# Patient Record
Sex: FEMALE | Race: WHITE | NOT HISPANIC OR LATINO | Employment: STUDENT | URBAN - NONMETROPOLITAN AREA
[De-identification: names, ages, dates, MRNs, and addresses within clinical notes are randomized per-mention and may not be internally consistent; named-entity substitution may affect disease eponyms.]

---

## 2024-04-25 LAB
CHOLEST SERPL-MSCNC: 216 MG/DL (ref 0–200)
HDLC SERPL-MCNC: 39 MG/DL (ref 35–70)
LDLC SERPL CALC-MCNC: 145 MG/DL (ref 0–160)
TRIGL SERPL-MCNC: 176 MG/DL (ref 40–160)
VLDLC SERPL-MCNC: 32 MG/DL

## 2024-05-21 ENCOUNTER — OFFICE VISIT (OUTPATIENT)
Dept: PRIMARY CARE CLINIC | Facility: CLINIC | Age: 29
End: 2024-05-21
Payer: MEDICAID

## 2024-05-21 VITALS
OXYGEN SATURATION: 99 % | BODY MASS INDEX: 31 KG/M2 | DIASTOLIC BLOOD PRESSURE: 73 MMHG | HEIGHT: 61 IN | WEIGHT: 164.19 LBS | TEMPERATURE: 98 F | SYSTOLIC BLOOD PRESSURE: 120 MMHG | HEART RATE: 111 BPM | RESPIRATION RATE: 18 BRPM

## 2024-05-21 DIAGNOSIS — Z76.89 ENCOUNTER TO ESTABLISH CARE: Primary | ICD-10-CM

## 2024-05-21 DIAGNOSIS — J45.20 MILD INTERMITTENT ASTHMA, UNSPECIFIED WHETHER COMPLICATED: ICD-10-CM

## 2024-05-21 DIAGNOSIS — Z23 NEED FOR TDAP VACCINATION: ICD-10-CM

## 2024-05-21 DIAGNOSIS — J35.1 LARGE TONSILS: ICD-10-CM

## 2024-05-21 DIAGNOSIS — F32.A DEPRESSION, UNSPECIFIED DEPRESSION TYPE: ICD-10-CM

## 2024-05-21 DIAGNOSIS — H47.10 PAPILLEDEMA: ICD-10-CM

## 2024-05-21 DIAGNOSIS — M79.7 FIBROMYALGIA: ICD-10-CM

## 2024-05-21 DIAGNOSIS — M54.32 BILATERAL SCIATICA: ICD-10-CM

## 2024-05-21 DIAGNOSIS — M35.9 CONNECTIVE TISSUE DISORDER: ICD-10-CM

## 2024-05-21 DIAGNOSIS — E55.9 VITAMIN D DEFICIENCY: ICD-10-CM

## 2024-05-21 DIAGNOSIS — R39.12 WEAK URINE STREAM: ICD-10-CM

## 2024-05-21 DIAGNOSIS — F90.0 ATTENTION DEFICIT HYPERACTIVITY DISORDER (ADHD), PREDOMINANTLY INATTENTIVE TYPE: ICD-10-CM

## 2024-05-21 DIAGNOSIS — N92.6 IRREGULAR MENSTRUAL CYCLE: ICD-10-CM

## 2024-05-21 DIAGNOSIS — M54.31 BILATERAL SCIATICA: ICD-10-CM

## 2024-05-21 PROBLEM — F64.0 TRANSSEXUALISM: Status: ACTIVE | Noted: 2017-12-19

## 2024-05-21 PROBLEM — G89.29 CHRONIC PAIN: Status: ACTIVE | Noted: 2022-06-10

## 2024-05-21 PROBLEM — M54.30 BACK PAIN WITH SCIATICA: Status: ACTIVE | Noted: 2022-09-15

## 2024-05-21 PROBLEM — M54.9 BACK PAIN WITH SCIATICA: Status: ACTIVE | Noted: 2022-09-15

## 2024-05-21 PROBLEM — K21.9 GASTROESOPHAGEAL REFLUX DISEASE: Status: ACTIVE | Noted: 2022-06-10

## 2024-05-21 PROBLEM — R20.2 NUMBNESS AND TINGLING OF HAND: Status: RESOLVED | Noted: 2022-09-15 | Resolved: 2024-05-21

## 2024-05-21 PROBLEM — E66.9 OBESITY: Status: ACTIVE | Noted: 2022-06-10

## 2024-05-21 PROBLEM — F31.9 BIPOLAR 1 DISORDER: Status: RESOLVED | Noted: 2022-06-10 | Resolved: 2024-05-21

## 2024-05-21 PROBLEM — R20.0 NUMBNESS AND TINGLING OF HAND: Status: RESOLVED | Noted: 2022-09-15 | Resolved: 2024-05-21

## 2024-05-21 PROBLEM — F33.2 SEVERE RECURRENT MAJOR DEPRESSION WITHOUT PSYCHOTIC FEATURES: Status: ACTIVE | Noted: 2022-06-10

## 2024-05-21 PROBLEM — Z79.890 HORMONE REPLACEMENT THERAPY: Status: ACTIVE | Noted: 2021-09-22

## 2024-05-21 PROBLEM — E78.00 HYPERCHOLESTEREMIA: Status: ACTIVE | Noted: 2022-05-11

## 2024-05-21 PROBLEM — K59.00 CONSTIPATION: Status: ACTIVE | Noted: 2022-06-10

## 2024-05-21 PROBLEM — Z78.9 FEMALE-TO-MALE TRANSGENDER PERSON: Status: ACTIVE | Noted: 2021-09-22

## 2024-05-21 PROBLEM — F90.9 ATTENTION DEFICIT HYPERACTIVITY DISORDER: Status: ACTIVE | Noted: 2022-06-10

## 2024-05-21 PROCEDURE — 3008F BODY MASS INDEX DOCD: CPT | Mod: CPTII,,, | Performed by: NURSE PRACTITIONER

## 2024-05-21 PROCEDURE — 3078F DIAST BP <80 MM HG: CPT | Mod: CPTII,,, | Performed by: NURSE PRACTITIONER

## 2024-05-21 PROCEDURE — 99999PBSHW TDAP VACCINE GREATER THAN OR EQUAL TO 7YO IM: Mod: PBBFAC,,,

## 2024-05-21 PROCEDURE — 99999 PR PBB SHADOW E&M-NEW PATIENT-LVL V: CPT | Mod: PBBFAC,,, | Performed by: NURSE PRACTITIONER

## 2024-05-21 PROCEDURE — 99205 OFFICE O/P NEW HI 60 MIN: CPT | Mod: S$PBB,,, | Performed by: NURSE PRACTITIONER

## 2024-05-21 PROCEDURE — 90715 TDAP VACCINE 7 YRS/> IM: CPT | Mod: PBBFAC

## 2024-05-21 PROCEDURE — 1159F MED LIST DOCD IN RCRD: CPT | Mod: CPTII,,, | Performed by: NURSE PRACTITIONER

## 2024-05-21 PROCEDURE — 3074F SYST BP LT 130 MM HG: CPT | Mod: CPTII,,, | Performed by: NURSE PRACTITIONER

## 2024-05-21 PROCEDURE — 90471 IMMUNIZATION ADMIN: CPT | Mod: PBBFAC

## 2024-05-21 PROCEDURE — 99205 OFFICE O/P NEW HI 60 MIN: CPT | Mod: PBBFAC,25 | Performed by: NURSE PRACTITIONER

## 2024-05-21 RX ORDER — MONTELUKAST SODIUM 10 MG/1
10 TABLET ORAL
COMMUNITY
Start: 2024-05-16

## 2024-05-21 RX ORDER — DULOXETINE 40 MG/1
2 CAPSULE, DELAYED RELEASE ORAL
COMMUNITY
Start: 2024-05-14

## 2024-05-21 RX ORDER — FINASTERIDE 5 MG/1
5 TABLET, FILM COATED ORAL DAILY
COMMUNITY

## 2024-05-21 RX ORDER — CLINDAMYCIN PHOSPHATE 10 UG/ML
LOTION TOPICAL
COMMUNITY
Start: 2024-05-10

## 2024-05-21 RX ORDER — MINOXIDIL 2.5 MG/1
2.5 TABLET ORAL NIGHTLY
COMMUNITY
Start: 2024-05-10

## 2024-05-21 RX ORDER — ERGOCALCIFEROL 1.25 MG/1
50000 CAPSULE ORAL
Qty: 8 CAPSULE | Refills: 1 | Status: SHIPPED | OUTPATIENT
Start: 2024-05-23 | End: 2024-07-22

## 2024-05-21 RX ORDER — GABAPENTIN 300 MG/1
300 CAPSULE ORAL 3 TIMES DAILY
COMMUNITY
Start: 2024-05-16

## 2024-05-21 RX ORDER — NEEDLES, DISPOSABLE 27GX1/2"
NEEDLE, DISPOSABLE MISCELLANEOUS
COMMUNITY

## 2024-05-21 RX ORDER — ALBUTEROL SULFATE 90 UG/1
2 AEROSOL, METERED RESPIRATORY (INHALATION) EVERY 6 HOURS PRN
COMMUNITY
Start: 2024-04-27

## 2024-05-21 RX ORDER — ATOMOXETINE 80 MG/1
80 CAPSULE ORAL EVERY MORNING
COMMUNITY
Start: 2024-05-14

## 2024-05-21 RX ORDER — SYRINGE W-NEEDLE,DISPOSAB,3 ML 25GX5/8"
SYRINGE, EMPTY DISPOSABLE MISCELLANEOUS
COMMUNITY

## 2024-05-21 NOTE — PROGRESS NOTES
"[facility] Note    HPI:  Jonas Aguilar is a 29 y.o. female who presents today for Establish Care, Pelvic Pain, and Vaginal Bleeding (Pt here to establish care/lower pelvis pain/vaginal bleeding x 3 weeks/ had depo shot 3-18-24)      Review of Systems   Constitutional:  Positive for malaise/fatigue.   HENT: Negative.     Eyes: Negative.    Respiratory: Negative.     Cardiovascular: Negative.    Gastrointestinal:  Positive for abdominal pain, constipation, diarrhea and heartburn.   Genitourinary:  Positive for frequency.   Musculoskeletal:  Positive for back pain, falls and joint pain.   Skin: Negative.    Neurological:  Positive for dizziness and headaches.   Endo/Heme/Allergies: Negative.    Psychiatric/Behavioral:  Positive for depression and suicidal ideas. The patient is nervous/anxious and has insomnia.       A review of systems was performed and was negative except as noted above.    I personally reviewed allergies, past medical, surgical, social and family history and updated as appropriate.    Medications:    Current Outpatient Medications:     albuterol (PROVENTIL/VENTOLIN HFA) 90 mcg/actuation inhaler, 2 puffs every 6 (six) hours as needed., Disp: , Rfl:     atomoxetine (STRATTERA) 80 MG capsule, Take 80 mg by mouth every morning., Disp: , Rfl:     cholecalciferol, vitamin D3, (VITAMIN D3) 2,000 unit Cap, Take 1 capsule by mouth once daily., Disp: , Rfl:     clindamycin (CLEOCIN T) 1 % lotion, Apply topically., Disp: , Rfl:     DULoxetine 40 mg CpDR, Take 2 capsules by mouth., Disp: , Rfl:     finasteride (PROSCAR) 5 mg tablet, Take 5 mg by mouth once daily., Disp: , Rfl:     gabapentin (NEURONTIN) 300 MG capsule, Take 300 mg by mouth 3 (three) times daily., Disp: , Rfl:     minoxidiL (LONITEN) 2.5 MG tablet, Take 2.5 mg by mouth every evening., Disp: , Rfl:     montelukast (SINGULAIR) 10 mg tablet, Take 10 mg by mouth., Disp: , Rfl:     needle, disp, 18 G 18 gauge x 1" Ndle, BD Regular " "Bevel Needles 18 gauge x 1"  USE 1 EACH EVERY 7 DAYS TO DRAW UP TESTOTERONE, Disp: , Rfl:     omeprazole (PRILOSEC) 20 MG capsule, Take 20 mg by mouth once daily., Disp: , Rfl:     syringe with needle 3 mL 21 gauge x 1 1/2" Syrg, BD Luer-Edmar Syringe 3 mL 21 gauge x 1 1/2"  USE 1 SYRINGE FOR TESTOSTERONE INJ EVERY 7 DAYS.(SYRINGE SIZE DIFFERENT/MD), Disp: , Rfl:     [START ON 5/23/2024] ergocalciferol (VITAMIN D2) 50,000 unit Cap, Take 1 capsule (50,000 Units total) by mouth twice a week., Disp: 8 capsule, Rfl: 1    ferrous sulfate 325 (65 FE) MG EC tablet, Take 325 mg by mouth 3 (three) times daily with meals. (Patient not taking: Reported on 5/21/2024), Disp: , Rfl:     fexofenadine (ALLEGRA) 30 MG tablet, Take 30 mg by mouth 2 (two) times daily. (Patient not taking: Reported on 5/21/2024), Disp: , Rfl:      Health Maintenance:  Immunization History   Administered Date(s) Administered    DTP 1997, 1997, 1997, 07/06/1998    DTaP 04/22/2002    HIB 1997, 1997, 1997, 07/06/1998    HPV 9-Valent 07/22/2022    Hepatitis B, Pediatric/Adolescent 1997, 1997, 1997    IPV 04/22/2002    MMR 02/19/1998, 04/22/2002    Meningococcal Conjugate (MCV4P) 07/07/2008    OPV 1997, 1997, 1997    Tdap 07/07/2008    Varicella 02/19/1998, 07/07/2008      Health Maintenance   Topic Date Due    Hepatitis C Screening  Never done    TETANUS VACCINE  07/07/2018    Lipid Panel  Completed     Health Maintenance Topics with due status: Not Due       Topic Last Completion Date    Influenza Vaccine Not Due     Health Maintenance Due   Topic Date Due    Hepatitis C Screening  Never done    HIV Screening  Never done    TETANUS VACCINE  07/07/2018    COVID-19 Vaccine (1 - 2023-24 season) Never done       PHYSICAL EXAM:  Vitals:    05/21/24 1339   BP: 120/73   BP Location: Left arm   Patient Position: Sitting   BP Method: Medium (Automatic)   Pulse: (!) 111   Resp: 18   Temp: 98 °F " "(36.7 °C)   TempSrc: Temporal   SpO2: 99%   Weight: 74.5 kg (164 lb 3.2 oz)   Height: 5' 1" (1.549 m)     Body mass index is 31.03 kg/m².  Physical Exam  Vitals and nursing note reviewed.   Constitutional:       Appearance: Normal appearance. She is normal weight.   HENT:      Head: Normocephalic.      Right Ear: Tympanic membrane, ear canal and external ear normal.      Left Ear: Tympanic membrane, ear canal and external ear normal.      Nose: Nose normal.      Mouth/Throat:      Mouth: Mucous membranes are moist.      Pharynx: Posterior oropharyngeal erythema present.      Tonsils: 2+ on the right. 2+ on the left.   Cardiovascular:      Rate and Rhythm: Normal rate and regular rhythm.      Pulses: Normal pulses.      Heart sounds: Normal heart sounds.   Pulmonary:      Effort: Pulmonary effort is normal.      Breath sounds: Normal breath sounds.   Abdominal:      General: Abdomen is flat. Bowel sounds are normal.      Palpations: Abdomen is soft.   Musculoskeletal:         General: Normal range of motion.      Cervical back: Normal range of motion.   Skin:     General: Skin is warm and dry.   Neurological:      General: No focal deficit present.      Mental Status: She is alert and oriented to person, place, and time.          ASSESSMENT/PLAN:  1. Encounter to establish care    2. Irregular menstrual cycle  -     Ambulatory referral/consult to Obstetrics / Gynecology; Future; Expected date: 05/28/2024    3. Large tonsils  -     Ambulatory referral/consult to ENT; Future; Expected date: 05/28/2024    4. Depression, unspecified depression type    5. Attention deficit hyperactivity disorder (ADHD), predominantly inattentive type    6. Papilledema    7. Mild intermittent asthma, unspecified whether complicated    8. Connective tissue disorder    9. Bilateral sciatica    10. Fibromyalgia    11. Vitamin D deficiency  -     Misc Sendout Test, Blood Vitamin D; Future; Expected date: 07/21/2024    12. Weak urine stream  -  "    Ambulatory referral/consult to Urology; Future; Expected date: 05/21/2024    13. Need for Tdap vaccination  -     Cancel: (In Office Administered) Tdap Vaccine  -     (In Office Administered) Tdap Vaccine; Future; Expected date: 05/21/2024    Other orders  -     ergocalciferol (VITAMIN D2) 50,000 unit Cap; Take 1 capsule (50,000 Units total) by mouth twice a week.  Dispense: 8 capsule; Refill: 1        Other than changes above, continue current medications and maintain follow up with specialists.      Follow up if symptoms worsen or fail to improve.   No results found for this or any previous visit (from the past 2016 hour(s)).      GORDON Castro  [facility]

## 2024-05-23 DIAGNOSIS — J35.1 LARGE TONSILS: Primary | ICD-10-CM

## 2024-05-23 DIAGNOSIS — H47.10 PAPILLEDEMA: ICD-10-CM

## 2024-05-23 DIAGNOSIS — Z11.59 ENCOUNTER FOR HEPATITIS C SCREENING TEST FOR LOW RISK PATIENT: Primary | ICD-10-CM

## 2024-05-23 DIAGNOSIS — Z11.4 ENCOUNTER FOR SCREENING FOR HIV: ICD-10-CM

## 2024-05-23 DIAGNOSIS — H47.10 PAPILLEDEMA: Primary | ICD-10-CM

## 2024-05-31 ENCOUNTER — LAB VISIT (OUTPATIENT)
Dept: PRIMARY CARE CLINIC | Facility: CLINIC | Age: 29
End: 2024-05-31
Payer: MEDICAID

## 2024-05-31 DIAGNOSIS — Z11.59 ENCOUNTER FOR HEPATITIS C SCREENING TEST FOR LOW RISK PATIENT: ICD-10-CM

## 2024-05-31 DIAGNOSIS — Z13.0 SCREENING FOR IRON DEFICIENCY ANEMIA: ICD-10-CM

## 2024-05-31 DIAGNOSIS — E55.9 VITAMIN D DEFICIENCY: ICD-10-CM

## 2024-05-31 DIAGNOSIS — E78.00 HYPERCHOLESTEREMIA: ICD-10-CM

## 2024-05-31 DIAGNOSIS — Z13.29 THYROID DISORDER SCREEN: ICD-10-CM

## 2024-05-31 DIAGNOSIS — Z13.1 SCREENING FOR DIABETES MELLITUS (DM): ICD-10-CM

## 2024-05-31 DIAGNOSIS — Z11.4 ENCOUNTER FOR SCREENING FOR HIV: ICD-10-CM

## 2024-05-31 DIAGNOSIS — E78.00 HYPERCHOLESTEREMIA: Primary | ICD-10-CM

## 2024-05-31 DIAGNOSIS — E55.9 VITAMIN D DEFICIENCY DISEASE: ICD-10-CM

## 2024-05-31 DIAGNOSIS — E87.6 HYPOKALEMIA: Primary | ICD-10-CM

## 2024-05-31 LAB
25(OH)D3+25(OH)D2 SERPL-MCNC: 29 NG/ML (ref 30–96)
ALBUMIN SERPL BCP-MCNC: 3.8 G/DL (ref 3.5–5.2)
ALP SERPL-CCNC: 70 U/L (ref 55–135)
ALT SERPL W/O P-5'-P-CCNC: 28 U/L (ref 10–44)
ANION GAP SERPL CALC-SCNC: 11 MMOL/L (ref 3–11)
AST SERPL-CCNC: 14 U/L (ref 10–40)
BASOPHILS # BLD AUTO: 0.05 K/UL (ref 0–0.2)
BASOPHILS NFR BLD: 0.6 % (ref 0–1.9)
BILIRUB SERPL-MCNC: 0.5 MG/DL (ref 0.1–1)
BUN SERPL-MCNC: 11 MG/DL (ref 6–20)
CALCIUM SERPL-MCNC: 9.3 MG/DL (ref 8.7–10.5)
CHLORIDE SERPL-SCNC: 104 MMOL/L (ref 95–110)
CHOLEST SERPL-MCNC: 197 MG/DL (ref 120–199)
CHOLEST/HDLC SERPL: 5.3 {RATIO} (ref 2–5)
CO2 SERPL-SCNC: 23 MMOL/L (ref 23–29)
CREAT SERPL-MCNC: 1 MG/DL (ref 0.5–1.4)
DIFFERENTIAL METHOD BLD: ABNORMAL
EOSINOPHIL # BLD AUTO: 0.1 K/UL (ref 0–0.5)
EOSINOPHIL NFR BLD: 1.2 % (ref 0–8)
ERYTHROCYTE [DISTWIDTH] IN BLOOD BY AUTOMATED COUNT: 12.4 % (ref 11.5–14.5)
EST. GFR  (NO RACE VARIABLE): >60 ML/MIN/1.73 M^2
ESTIMATED AVG GLUCOSE: 103 MG/DL (ref 68–131)
GLUCOSE SERPL-MCNC: 131 MG/DL (ref 70–110)
HBA1C MFR BLD: 5.2 % (ref 4–5.6)
HCT VFR BLD AUTO: 48.8 % (ref 37–48.5)
HCV AB SERPL QL IA: NORMAL
HDLC SERPL-MCNC: 37 MG/DL (ref 40–75)
HDLC SERPL: 18.8 % (ref 20–50)
HGB BLD-MCNC: 16.4 G/DL (ref 12–16)
HIV 1+2 AB+HIV1 P24 AG SERPL QL IA: NORMAL
IMM GRANULOCYTES # BLD AUTO: 0.02 K/UL (ref 0–0.04)
IMM GRANULOCYTES NFR BLD AUTO: 0.2 % (ref 0–0.5)
LDLC SERPL CALC-MCNC: 121.6 MG/DL (ref 63–159)
LYMPHOCYTES # BLD AUTO: 2.4 K/UL (ref 1–4.8)
LYMPHOCYTES NFR BLD: 28.5 % (ref 18–48)
MCH RBC QN AUTO: 29.5 PG (ref 27–31)
MCHC RBC AUTO-ENTMCNC: 33.6 G/DL (ref 32–36)
MCV RBC AUTO: 88 FL (ref 82–98)
MONOCYTES # BLD AUTO: 0.7 K/UL (ref 0.3–1)
MONOCYTES NFR BLD: 8.3 % (ref 4–15)
NEUTROPHILS # BLD AUTO: 5.2 K/UL (ref 1.8–7.7)
NEUTROPHILS NFR BLD: 61.2 % (ref 38–73)
NONHDLC SERPL-MCNC: 160 MG/DL
NRBC BLD-RTO: 0 /100 WBC
PLATELET # BLD AUTO: 259 K/UL (ref 150–450)
PMV BLD AUTO: 11.4 FL (ref 9.2–12.9)
POTASSIUM SERPL-SCNC: 3.4 MMOL/L (ref 3.5–5.1)
PROT SERPL-MCNC: 7.8 G/DL (ref 6–8.4)
RBC # BLD AUTO: 5.55 M/UL (ref 4–5.4)
SODIUM SERPL-SCNC: 138 MMOL/L (ref 136–145)
TRIGL SERPL-MCNC: 192 MG/DL (ref 30–150)
TSH SERPL DL<=0.005 MIU/L-ACNC: 0.59 UIU/ML (ref 0.4–4)
WBC # BLD AUTO: 8.53 K/UL (ref 3.9–12.7)

## 2024-05-31 PROCEDURE — 82306 VITAMIN D 25 HYDROXY: CPT | Performed by: NURSE PRACTITIONER

## 2024-05-31 PROCEDURE — 84443 ASSAY THYROID STIM HORMONE: CPT | Performed by: NURSE PRACTITIONER

## 2024-05-31 PROCEDURE — 80061 LIPID PANEL: CPT | Performed by: NURSE PRACTITIONER

## 2024-05-31 PROCEDURE — 83036 HEMOGLOBIN GLYCOSYLATED A1C: CPT | Performed by: NURSE PRACTITIONER

## 2024-05-31 PROCEDURE — 86803 HEPATITIS C AB TEST: CPT | Performed by: NURSE PRACTITIONER

## 2024-05-31 PROCEDURE — 80053 COMPREHEN METABOLIC PANEL: CPT | Performed by: NURSE PRACTITIONER

## 2024-05-31 PROCEDURE — 36415 COLL VENOUS BLD VENIPUNCTURE: CPT | Performed by: NURSE PRACTITIONER

## 2024-05-31 PROCEDURE — 87389 HIV-1 AG W/HIV-1&-2 AB AG IA: CPT | Performed by: NURSE PRACTITIONER

## 2024-05-31 PROCEDURE — 85025 COMPLETE CBC W/AUTO DIFF WBC: CPT | Performed by: NURSE PRACTITIONER

## 2024-06-03 ENCOUNTER — OFFICE VISIT (OUTPATIENT)
Dept: UROLOGY | Facility: CLINIC | Age: 29
End: 2024-06-03
Attending: SPECIALIST
Payer: MEDICAID

## 2024-06-03 VITALS — BODY MASS INDEX: 30.43 KG/M2 | WEIGHT: 161.19 LBS | HEIGHT: 61 IN

## 2024-06-03 DIAGNOSIS — R39.12 WEAK URINE STREAM: ICD-10-CM

## 2024-06-03 PROCEDURE — 99999 PR PBB SHADOW E&M-EST. PATIENT-LVL III: CPT | Mod: PBBFAC,,, | Performed by: SPECIALIST

## 2024-06-03 PROCEDURE — 1159F MED LIST DOCD IN RCRD: CPT | Mod: CPTII,,, | Performed by: SPECIALIST

## 2024-06-03 PROCEDURE — 99204 OFFICE O/P NEW MOD 45 MIN: CPT | Mod: S$PBB,,, | Performed by: SPECIALIST

## 2024-06-03 PROCEDURE — 1160F RVW MEDS BY RX/DR IN RCRD: CPT | Mod: CPTII,,, | Performed by: SPECIALIST

## 2024-06-03 PROCEDURE — 3008F BODY MASS INDEX DOCD: CPT | Mod: CPTII,,, | Performed by: SPECIALIST

## 2024-06-03 PROCEDURE — 99213 OFFICE O/P EST LOW 20 MIN: CPT | Mod: PBBFAC | Performed by: SPECIALIST

## 2024-06-03 PROCEDURE — 3044F HG A1C LEVEL LT 7.0%: CPT | Mod: CPTII,,, | Performed by: SPECIALIST

## 2024-06-03 NOTE — PROGRESS NOTES
"[facility] - Urology   Clinic Note    SUBJECTIVE:     Chief Complaint   Patient presents with    weak stream     States has seen previous Urologist in [place] and was told he thinks it due to constipation, has been having worsening symptoms since then.        Referral from: Khalida Wesley NP.    History of Present Illness:  Jonas Aguilar is a 29 y.o. trans female who presents to clinic for voiding dysfunction.    Long hx of LUTS.  In summary, they claim to have to wait a long time to empty the bladder and that they are not emptying completely.  "As soon as I leave the bathroom, I have to return and empty again."  No hx of recurrent UTIs.  No hx of hematuria.      Past Surgical History:   Procedure Laterality Date    LIPOSUCTION      MASTECTOMY         Family History   Problem Relation Name Age of Onset    Cancer Maternal Grandmother          breast       Social History     Tobacco Use    Smoking status: Never    Smokeless tobacco: Never   Substance Use Topics    Alcohol use: Yes     Alcohol/week: 3.0 standard drinks of alcohol     Types: 3 Standard drinks or equivalent per week     Comment: occ    Drug use: Yes     Types: Marijuana       Current Outpatient Medications on File Prior to Visit   Medication Sig Dispense Refill    albuterol (PROVENTIL/VENTOLIN HFA) 90 mcg/actuation inhaler 2 puffs every 6 (six) hours as needed.      atomoxetine (STRATTERA) 80 MG capsule Take 80 mg by mouth every morning.      cholecalciferol, vitamin D3, (VITAMIN D3) 2,000 unit Cap Take 1 capsule by mouth once daily.      clindamycin (CLEOCIN T) 1 % lotion Apply topically.      DULoxetine 40 mg CpDR Take 2 capsules by mouth.      ergocalciferol (VITAMIN D2) 50,000 unit Cap Take 1 capsule (50,000 Units total) by mouth twice a week. 8 capsule 1    finasteride (PROSCAR) 5 mg tablet Take 5 mg by mouth once daily.      gabapentin (NEURONTIN) 300 MG capsule Take 300 mg by mouth 3 (three) times daily.      minoxidiL (LONITEN) 2.5 MG tablet " "Take 2.5 mg by mouth every evening.      montelukast (SINGULAIR) 10 mg tablet Take 10 mg by mouth.      needle, disp, 18 G 18 gauge x 1" Ndle BD Regular Bevel Needles 18 gauge x 1"   USE 1 EACH EVERY 7 DAYS TO DRAW UP TESTOTERONE      omeprazole (PRILOSEC) 20 MG capsule Take 20 mg by mouth once daily.      syringe with needle 3 mL 21 gauge x 1 1/2" Syrg BD Luer-Edmar Syringe 3 mL 21 gauge x 1 1/2"   USE 1 SYRINGE FOR TESTOSTERONE INJ EVERY 7 DAYS.(SYRINGE SIZE DIFFERENT/MD)      ferrous sulfate 325 (65 FE) MG EC tablet Take 325 mg by mouth 3 (three) times daily with meals. (Patient not taking: Reported on 5/21/2024)      fexofenadine (ALLEGRA) 30 MG tablet Take 30 mg by mouth 2 (two) times daily. (Patient not taking: Reported on 5/21/2024)       No current facility-administered medications on file prior to visit.       Review of patient's allergies indicates:  No Known Allergies    ROS     Review of Systems:  A review of 10+ systems was conducted with pertinent positive and negative findings documented in HPI with all other systems reviewed and negative.    OBJECTIVE:     Estimated body mass index is 30.45 kg/m² as calculated from the following:    Height as of this encounter: 5' 1" (1.549 m).    Weight as of this encounter: 73.1 kg (161 lb 2.5 oz).    Vital Signs (Most Recent)  There were no vitals filed for this visit.    Physical Exam:    Physical Exam     GENERAL: patient sitting comfortably  HEENT: normocephalic  NECK: supple, no JVD  PULM: normal chest rise, no increased WOB  HEART: non-diaphoretic  ABDO: soft, nondistended, nontender  BACK: no CVA tenderness bilaterally  SKIN: warm, dry, well perfused  EXT: no bruising or edema  NEURO: grossly normal with no focal deficits  PSYCH: appropriate mood and affect    Genitourinary Exam:  deferred until cysto      LABS:     Lab Results   Component Value Date    BUN 11 05/31/2024    CREATININE 1.0 05/31/2024    WBC 8.53 05/31/2024    HGB 16.4 (H) 05/31/2024    HCT 48.8 " "(H) 05/31/2024     05/31/2024    AST 14 05/31/2024    ALT 28 05/31/2024    ALKPHOS 70 05/31/2024    ALBUMIN 3.8 05/31/2024    HGBA1C 5.2 05/31/2024        Urinalysis:   No results found for: "UAREFLEX"       Imaging:  I have personally reviewed all relevant imaging studies.    No results found for this or any previous visit (from the past 2160 hour(s)).  No results found for this or any previous visit (from the past 2160 hour(s)).  No image results found.         ASSESSMENT     1. Weak urine stream        PLAN:     cystoscopy  Consider a uroselective alpha blocker      Raj Jackson MD  Urology  [facility]     Disclaimer: This note has been generated using voice-recognition software. There may be typographical errors that have been missed during proof-reading.     "

## 2024-06-04 ENCOUNTER — OFFICE VISIT (OUTPATIENT)
Dept: OBSTETRICS AND GYNECOLOGY | Facility: CLINIC | Age: 29
End: 2024-06-04
Payer: MEDICAID

## 2024-06-04 VITALS
DIASTOLIC BLOOD PRESSURE: 82 MMHG | BODY MASS INDEX: 30.58 KG/M2 | HEART RATE: 78 BPM | WEIGHT: 162 LBS | HEIGHT: 61 IN | SYSTOLIC BLOOD PRESSURE: 126 MMHG

## 2024-06-04 DIAGNOSIS — R39.12 WEAK URINE STREAM: Primary | ICD-10-CM

## 2024-06-04 DIAGNOSIS — J45.20 MILD INTERMITTENT ASTHMA, UNSPECIFIED WHETHER COMPLICATED: ICD-10-CM

## 2024-06-04 DIAGNOSIS — Z78.9 FEMALE-TO-MALE TRANSGENDER PERSON: ICD-10-CM

## 2024-06-04 DIAGNOSIS — N92.1 BREAKTHROUGH BLEEDING ON DEPO PROVERA: ICD-10-CM

## 2024-06-04 DIAGNOSIS — N92.6 IRREGULAR MENSTRUAL CYCLE: Primary | ICD-10-CM

## 2024-06-04 DIAGNOSIS — F32.A DEPRESSION, UNSPECIFIED DEPRESSION TYPE: ICD-10-CM

## 2024-06-04 DIAGNOSIS — F64.9 GENDER DYSPHORIA: ICD-10-CM

## 2024-06-04 DIAGNOSIS — F41.9 ANXIETY: ICD-10-CM

## 2024-06-04 PROCEDURE — 3044F HG A1C LEVEL LT 7.0%: CPT | Mod: CPTII,,, | Performed by: OBSTETRICS & GYNECOLOGY

## 2024-06-04 PROCEDURE — 3074F SYST BP LT 130 MM HG: CPT | Mod: CPTII,,, | Performed by: OBSTETRICS & GYNECOLOGY

## 2024-06-04 PROCEDURE — 99999 PR PBB SHADOW E&M-EST. PATIENT-LVL III: CPT | Mod: PBBFAC,,, | Performed by: OBSTETRICS & GYNECOLOGY

## 2024-06-04 PROCEDURE — 3008F BODY MASS INDEX DOCD: CPT | Mod: CPTII,,, | Performed by: OBSTETRICS & GYNECOLOGY

## 2024-06-04 PROCEDURE — 99213 OFFICE O/P EST LOW 20 MIN: CPT | Mod: PBBFAC | Performed by: OBSTETRICS & GYNECOLOGY

## 2024-06-04 PROCEDURE — 3079F DIAST BP 80-89 MM HG: CPT | Mod: CPTII,,, | Performed by: OBSTETRICS & GYNECOLOGY

## 2024-06-04 PROCEDURE — 99204 OFFICE O/P NEW MOD 45 MIN: CPT | Mod: S$PBB,,, | Performed by: OBSTETRICS & GYNECOLOGY

## 2024-06-04 RX ORDER — OMEPRAZOLE 40 MG/1
40 CAPSULE, DELAYED RELEASE ORAL
COMMUNITY
Start: 2024-02-08

## 2024-06-04 RX ORDER — TESTOSTERONE CYPIONATE 200 MG/ML
INJECTION, SOLUTION INTRAMUSCULAR
COMMUNITY

## 2024-06-04 RX ORDER — MEDROXYPROGESTERONE ACETATE 150 MG/ML
150 INJECTION, SUSPENSION INTRAMUSCULAR
Status: SHIPPED | OUTPATIENT
Start: 2024-06-04

## 2024-06-04 NOTE — PROGRESS NOTES
Subjective:    Patient ID: Jonas Aguilar is a 29 y.o. y.o. female    Chief Complaint:   Chief Complaint   Patient presents with    new GYN     Would like to discuss permanent birth control and no cycles.        History of Present Illness:  Jonas presents today for discussion of permanent birth control.    Has been on testosterone therapy for quite some time (since age 17) and has not bled since beginning meds.  Having almost daily Depo-Provera injection wants a very reliable form of birth control that allows no cycles.  Ultimately he wants to have a hysterectomy.  He began transition in [place] and was just about to have hysterectomy scheduled, but moved here and is getting established with all new providers    He is currently due for his next Depo-Provera injection on June 18.       Review of Systems   Constitutional:  Negative for chills and fever.   Respiratory:  Negative for shortness of breath.    Cardiovascular:  Negative for chest pain.   Gastrointestinal:  Negative for constipation.   Genitourinary:  Positive for menstrual problem.   Neurological:  Negative for headaches.         Objective:    Vital Signs:  Vitals:    06/04/24 1351   BP: 126/82   Pulse: 78     Wt Readings from Last 1 Encounters:   06/27/24 73.9 kg (163 lb)     Body mass index is 30.61 kg/m².    Physical Exam:  General:  alert, no distress    Discussion only     Explained to him how Depo-Provera works and discussed use of progesterone secreting IUD as a more local reaction rather than systemic medication for cycle control.  I expect that this could relieve symptoms a bit better while giving excellent birth control coverage.  This would be an optimal bridge until hysterectomy can be achieved.  We will administer Depo-Provera injection today as IUD will not be able to be placed until sometime after next injection is due.    I spent a total of 45 minutes on the day of the visit.  This includes face to face time and non-face to face time  preparing to see the patient (eg, review of tests), obtaining and/or reviewing separately obtained history, documenting clinical information in the electronic or other health record, independently interpreting results and communicating results to the patient/family/caregiver, or care coordinator.       Assessment:      1. Irregular menstrual cycle    2. Depression, unspecified depression type    3. Anxiety    4. Mild intermittent asthma, unspecified whether complicated    5. Gender dysphoria    6. Breakthrough bleeding on depo provera    7. Female-to-male transgender person          Plan:      Irregular menstrual cycle  -     Ambulatory referral/consult to Obstetrics / Gynecology  -     Prior authorization Order  -     Device Authorization Order  -     medroxyPROGESTERone (DEPO-PROVERA) injection 150 mg    Depression, unspecified depression type    Anxiety    Mild intermittent asthma, unspecified whether complicated    Gender dysphoria  -     Device Authorization Order    Breakthrough bleeding on depo provera  -     Device Authorization Order    Female-to-male transgender person  -     Device Authorization Order           Corrine Hernandez MD, FACOG   06/04/2024 2:27 PM

## 2024-06-17 ENCOUNTER — TELEPHONE (OUTPATIENT)
Dept: UROLOGY | Facility: CLINIC | Age: 29
End: 2024-06-17
Payer: MEDICAID

## 2024-06-17 NOTE — TELEPHONE ENCOUNTER
Returned call to patient mother regarding rescheduling his appointment to another day. Notified her that I can't schedule the cystoscope in [place] due to us not having all equipment and that's the reason the appointment was scheduled in [place]. Stated she was told the appointment could be moved to [place] by front staff and told her we couldn't. States she will call back once she makes it home to reschedule to next week.

## 2024-06-17 NOTE — TELEPHONE ENCOUNTER
----- Message from Bethany Pollard sent at 2024  1:18 PM CDT -----  Contact: Mother, Jeannie Aguilar  MRN: [id number]  : 1995  PCP: Khalida Wesley  Home Phone      [phone]  Work Phone      Not on file.  Mobile          [phone]  Mobile          [phone]  Home Phone      Not on file.      MESSAGE: Patient needs his appointment rescheduled from . Please return this call    PHONE; [phone]

## 2024-06-27 ENCOUNTER — PROCEDURE VISIT (OUTPATIENT)
Dept: OBSTETRICS AND GYNECOLOGY | Facility: CLINIC | Age: 29
End: 2024-06-27
Payer: MEDICAID

## 2024-06-27 VITALS
HEART RATE: 99 BPM | SYSTOLIC BLOOD PRESSURE: 122 MMHG | HEIGHT: 61 IN | WEIGHT: 163 LBS | BODY MASS INDEX: 30.78 KG/M2 | DIASTOLIC BLOOD PRESSURE: 68 MMHG

## 2024-06-27 DIAGNOSIS — Z30.430 ENCOUNTER FOR IUD INSERTION: Primary | ICD-10-CM

## 2024-06-27 DIAGNOSIS — Z32.02 PREGNANCY TEST NEGATIVE: ICD-10-CM

## 2024-06-27 DIAGNOSIS — N92.6 IRREGULAR MENSTRUAL CYCLE: ICD-10-CM

## 2024-06-27 LAB
B-HCG UR QL: NEGATIVE
CTP QC/QA: YES

## 2024-06-27 PROCEDURE — 99999PBSHW POCT URINE PREGNANCY: Mod: PBBFAC,,,

## 2024-06-27 PROCEDURE — 99999PBSHW PR PBB SHADOW TECHNICAL ONLY FILED TO HB: Mod: PBBFAC,,,

## 2024-06-27 PROCEDURE — 81025 URINE PREGNANCY TEST: CPT | Mod: PBBFAC | Performed by: OBSTETRICS & GYNECOLOGY

## 2024-06-27 PROCEDURE — 58300 INSERT INTRAUTERINE DEVICE: CPT | Mod: PBBFAC | Performed by: OBSTETRICS & GYNECOLOGY

## 2024-06-27 RX ADMIN — LEVONORGESTREL 18.6 MCG: 52 INTRAUTERINE DEVICE INTRAUTERINE at 01:06

## 2024-06-27 NOTE — PROCEDURES
Insertion of IUD    Date/Time: 6/27/2024 1:30 PM    Performed by: Corrine Hernandez MD  Authorized by: Corrine Hernandez MD    Consent:     Consent obtained:  Prior to procedure the appropriate consent was completed and verified    Consent given by:  Patient    Procedure risks and benefits discussed: yes      Patient questions answered: yes      Patient agrees, verbalizes understanding, and wants to proceed: yes     Device to be inserted was verified by patient: yes    Educational handouts given: yes    Insertion Procedure:   18.6 mcg levonorgestreL 20.4 mcg/24 hr (8 yrs) 52 mg       Pelvic exam performed: yes      Negative urine pregnancy test: yes      Cervix cleaned and prepped: yes (Betadine)      Speculum placed in vagina: yes      Tenaculum applied to cervix: yes (Tenaculum site hemostatic with silver nitrate)      Uterus sounded: yes      Uterus sound depth (cm):  7    IUD inserted with no complications: yes      IUD type:  Liletta    Strings trimmed: yes    Post-procedure:     Patient tolerated procedure well: yes      Patient will follow up after next period: yes

## 2024-06-27 NOTE — PROCEDURES
"Jonas Aguilar is a 29 y.o. female patient.    Pulse: 99 (06/27/24 1327)  BP: 122/68 (06/27/24 1327)  Weight: 73.9 kg (163 lb) (06/27/24 1327)  Height: 5' 1" (154.9 cm) (06/27/24 1327)       Procedures    6/27/2024    "

## 2024-06-28 ENCOUNTER — PROCEDURE VISIT (OUTPATIENT)
Dept: UROLOGY | Facility: CLINIC | Age: 29
End: 2024-06-28
Attending: SPECIALIST
Payer: MEDICAID

## 2024-06-28 DIAGNOSIS — R39.12 WEAK URINE STREAM: ICD-10-CM

## 2024-06-28 RX ORDER — TAMSULOSIN HYDROCHLORIDE 0.4 MG/1
0.4 CAPSULE ORAL DAILY
Qty: 30 CAPSULE | Refills: 11 | Status: SHIPPED | OUTPATIENT
Start: 2024-06-28 | End: 2025-06-28

## 2024-06-28 NOTE — PROCEDURES
Procedures    Patient was placed on the examining table in a dorsal lithotomy position.  Genitalia appeared normal without any lesions.  Urethra was prepped with Betadine followed by lidocaine jelly for topical anesthesia.  I attempted flexible urethroscopy however patient was too uncomfortable to pass cystoscope into the bladder.  Regardless, I did not appreciate any urethral strictures.  Plan is to empirically start tamsulosin and reconsider cystourethroscopy under anesthesia if lower urinary tract symptoms persist.      Raj Jackson MD

## 2024-07-05 ENCOUNTER — TELEPHONE (OUTPATIENT)
Dept: PRIMARY CARE CLINIC | Facility: CLINIC | Age: 29
End: 2024-07-05
Payer: MEDICAID

## 2024-07-05 DIAGNOSIS — R10.84 GENERALIZED ABDOMINAL PAIN: Primary | ICD-10-CM

## 2024-07-05 NOTE — TELEPHONE ENCOUNTER
----- Message from Aydee Goldberg MA sent at 7/5/2024 11:09 AM CDT -----  Patient is requesting a referral be sent out to a GI provider that accepts medicaid due to increasing stomach issues,pain and cramping.

## 2024-07-08 ENCOUNTER — TELEPHONE (OUTPATIENT)
Dept: OBSTETRICS AND GYNECOLOGY | Facility: CLINIC | Age: 29
End: 2024-07-08
Payer: MEDICAID

## 2024-07-08 DIAGNOSIS — R10.84 GENERALIZED ABDOMINAL PAIN: Primary | ICD-10-CM

## 2024-07-09 ENCOUNTER — APPOINTMENT (OUTPATIENT)
Dept: OBSTETRICS AND GYNECOLOGY | Facility: CLINIC | Age: 29
End: 2024-07-09
Payer: MEDICAID

## 2024-07-09 DIAGNOSIS — Z30.431 IUD CHECK UP: Primary | ICD-10-CM

## 2024-07-09 PROCEDURE — 76856 US EXAM PELVIC COMPLETE: CPT | Mod: PBBFAC | Performed by: OBSTETRICS & GYNECOLOGY

## 2024-07-24 ENCOUNTER — LAB VISIT (OUTPATIENT)
Dept: PRIMARY CARE CLINIC | Facility: CLINIC | Age: 29
End: 2024-07-24
Payer: MEDICAID

## 2024-07-24 DIAGNOSIS — E55.9 VITAMIN D DEFICIENCY DISEASE: Primary | ICD-10-CM

## 2024-07-24 LAB — 25(OH)D3+25(OH)D2 SERPL-MCNC: 28 NG/ML (ref 30–96)

## 2024-07-24 PROCEDURE — 99999PBSHW PR PBB SHADOW TECHNICAL ONLY FILED TO HB: Mod: PBBFAC,,,

## 2024-07-24 PROCEDURE — 36415 COLL VENOUS BLD VENIPUNCTURE: CPT | Performed by: NURSE PRACTITIONER

## 2024-07-24 PROCEDURE — 82306 VITAMIN D 25 HYDROXY: CPT | Performed by: NURSE PRACTITIONER

## 2024-07-24 PROCEDURE — 36415 COLL VENOUS BLD VENIPUNCTURE: CPT | Mod: PBBFAC

## 2024-07-25 DIAGNOSIS — E55.9 VITAMIN D DEFICIENCY: Primary | ICD-10-CM

## 2024-07-25 RX ORDER — ERGOCALCIFEROL 1.25 MG/1
50000 CAPSULE ORAL
Qty: 4 CAPSULE | Refills: 1 | Status: SHIPPED | OUTPATIENT
Start: 2024-07-25 | End: 2024-09-23

## 2024-09-20 ENCOUNTER — LAB VISIT (OUTPATIENT)
Dept: PRIMARY CARE CLINIC | Facility: CLINIC | Age: 29
End: 2024-09-20
Payer: MEDICAID

## 2024-09-20 DIAGNOSIS — E55.9 VITAMIN D DEFICIENCY: ICD-10-CM

## 2024-09-20 DIAGNOSIS — E87.6 HYPOKALEMIA: ICD-10-CM

## 2024-09-20 LAB
25(OH)D3+25(OH)D2 SERPL-MCNC: 20 NG/ML (ref 30–96)
POTASSIUM SERPL-SCNC: 3.9 MMOL/L (ref 3.5–5.1)

## 2024-09-20 PROCEDURE — 36415 COLL VENOUS BLD VENIPUNCTURE: CPT | Performed by: NURSE PRACTITIONER

## 2024-09-20 PROCEDURE — 82306 VITAMIN D 25 HYDROXY: CPT | Performed by: NURSE PRACTITIONER

## 2024-09-20 PROCEDURE — 84132 ASSAY OF SERUM POTASSIUM: CPT | Performed by: NURSE PRACTITIONER

## 2024-10-04 ENCOUNTER — LAB VISIT (OUTPATIENT)
Dept: LAB | Facility: HOSPITAL | Age: 29
End: 2024-10-04
Attending: INTERNAL MEDICINE
Payer: MEDICAID

## 2024-10-04 DIAGNOSIS — Z79.890 HORMONE REPLACEMENT THERAPY (HRT): Primary | ICD-10-CM

## 2024-10-04 DIAGNOSIS — Z13.1 SCREENING FOR DIABETES MELLITUS: ICD-10-CM

## 2024-10-04 DIAGNOSIS — Z13.220 LIPID SCREENING: ICD-10-CM

## 2024-10-04 LAB
ALBUMIN SERPL BCP-MCNC: 4 G/DL (ref 3.5–5.2)
ALP SERPL-CCNC: 88 U/L (ref 55–135)
ALT SERPL W/O P-5'-P-CCNC: 24 U/L (ref 10–44)
ANION GAP SERPL CALC-SCNC: 9 MMOL/L (ref 3–11)
AST SERPL-CCNC: 14 U/L (ref 10–40)
BASOPHILS # BLD AUTO: 0.06 K/UL (ref 0–0.2)
BASOPHILS NFR BLD: 0.7 % (ref 0–1.9)
BILIRUB SERPL-MCNC: 0.8 MG/DL (ref 0.1–1)
BUN SERPL-MCNC: 13 MG/DL (ref 6–20)
CALCIUM SERPL-MCNC: 9.1 MG/DL (ref 8.7–10.5)
CHLORIDE SERPL-SCNC: 99 MMOL/L (ref 95–110)
CHOLEST SERPL-MCNC: 196 MG/DL (ref 120–199)
CHOLEST/HDLC SERPL: 5.3 {RATIO} (ref 2–5)
CO2 SERPL-SCNC: 29 MMOL/L (ref 23–29)
CREAT SERPL-MCNC: 1.2 MG/DL (ref 0.5–1.4)
DIFFERENTIAL METHOD BLD: NORMAL
EOSINOPHIL # BLD AUTO: 0.2 K/UL (ref 0–0.5)
EOSINOPHIL NFR BLD: 2 % (ref 0–8)
ERYTHROCYTE [DISTWIDTH] IN BLOOD BY AUTOMATED COUNT: 12.2 % (ref 11.5–14.5)
EST. GFR  (NO RACE VARIABLE): >60 ML/MIN/1.73 M^2
GLUCOSE SERPL-MCNC: 84 MG/DL (ref 70–110)
HCT VFR BLD AUTO: 44.9 % (ref 37–48.5)
HDLC SERPL-MCNC: 37 MG/DL (ref 40–75)
HDLC SERPL: 18.9 % (ref 20–50)
HGB BLD-MCNC: 15.5 G/DL (ref 12–16)
IMM GRANULOCYTES # BLD AUTO: 0.01 K/UL (ref 0–0.04)
IMM GRANULOCYTES NFR BLD AUTO: 0.1 % (ref 0–0.5)
LDLC SERPL CALC-MCNC: 129.4 MG/DL (ref 63–159)
LYMPHOCYTES # BLD AUTO: 3.2 K/UL (ref 1–4.8)
LYMPHOCYTES NFR BLD: 37.4 % (ref 18–48)
MCH RBC QN AUTO: 29.4 PG (ref 27–31)
MCHC RBC AUTO-ENTMCNC: 34.5 G/DL (ref 32–36)
MCV RBC AUTO: 85 FL (ref 82–98)
MONOCYTES # BLD AUTO: 0.9 K/UL (ref 0.3–1)
MONOCYTES NFR BLD: 10.9 % (ref 4–15)
NEUTROPHILS # BLD AUTO: 4.1 K/UL (ref 1.8–7.7)
NEUTROPHILS NFR BLD: 48.9 % (ref 38–73)
NONHDLC SERPL-MCNC: 159 MG/DL
NRBC BLD-RTO: 0 /100 WBC
PLATELET # BLD AUTO: 273 K/UL (ref 150–450)
PMV BLD AUTO: 10.7 FL (ref 9.2–12.9)
POTASSIUM SERPL-SCNC: 3.8 MMOL/L (ref 3.5–5.1)
PROT SERPL-MCNC: 8.2 G/DL (ref 6–8.4)
RBC # BLD AUTO: 5.28 M/UL (ref 4–5.4)
SODIUM SERPL-SCNC: 137 MMOL/L (ref 136–145)
TESTOST SERPL-MCNC: 563 NG/DL (ref 5–73)
TRIGL SERPL-MCNC: 148 MG/DL (ref 30–150)
WBC # BLD AUTO: 8.43 K/UL (ref 3.9–12.7)

## 2024-10-04 PROCEDURE — 85025 COMPLETE CBC W/AUTO DIFF WBC: CPT | Performed by: INTERNAL MEDICINE

## 2024-10-04 PROCEDURE — 80053 COMPREHEN METABOLIC PANEL: CPT | Performed by: INTERNAL MEDICINE

## 2024-10-04 PROCEDURE — 80061 LIPID PANEL: CPT | Performed by: INTERNAL MEDICINE

## 2024-10-04 PROCEDURE — 36415 COLL VENOUS BLD VENIPUNCTURE: CPT | Performed by: INTERNAL MEDICINE

## 2024-10-04 PROCEDURE — 84403 ASSAY OF TOTAL TESTOSTERONE: CPT | Performed by: INTERNAL MEDICINE

## 2024-10-24 PROBLEM — K58.8 OTHER IRRITABLE BOWEL SYNDROME: Status: ACTIVE | Noted: 2024-10-24

## 2024-10-25 ENCOUNTER — TELEPHONE (OUTPATIENT)
Dept: UROGYNECOLOGY | Facility: CLINIC | Age: 29
End: 2024-10-25
Payer: MEDICAID

## 2024-11-04 ENCOUNTER — OFFICE VISIT (OUTPATIENT)
Dept: PRIMARY CARE CLINIC | Facility: CLINIC | Age: 29
End: 2024-11-04
Payer: MEDICAID

## 2024-11-04 ENCOUNTER — HOSPITAL ENCOUNTER (OUTPATIENT)
Dept: RADIOLOGY | Facility: HOSPITAL | Age: 29
Discharge: HOME | End: 2024-11-04
Attending: NURSE PRACTITIONER
Payer: MEDICAID

## 2024-11-04 VITALS
TEMPERATURE: 98 F | HEART RATE: 102 BPM | WEIGHT: 172 LBS | DIASTOLIC BLOOD PRESSURE: 68 MMHG | SYSTOLIC BLOOD PRESSURE: 121 MMHG | HEIGHT: 61 IN | OXYGEN SATURATION: 96 % | BODY MASS INDEX: 32.47 KG/M2

## 2024-11-04 DIAGNOSIS — M25.512 ACUTE PAIN OF LEFT SHOULDER: ICD-10-CM

## 2024-11-04 DIAGNOSIS — Z23 NEED FOR INFLUENZA VACCINATION: ICD-10-CM

## 2024-11-04 DIAGNOSIS — Z23 NEED FOR PNEUMOCOCCAL 20-VALENT CONJUGATE VACCINATION: ICD-10-CM

## 2024-11-04 DIAGNOSIS — M25.512 ACUTE PAIN OF LEFT SHOULDER: Primary | ICD-10-CM

## 2024-11-04 PROCEDURE — 90472 IMMUNIZATION ADMIN EACH ADD: CPT | Mod: PBBFAC

## 2024-11-04 PROCEDURE — 1159F MED LIST DOCD IN RCRD: CPT | Mod: CPTII,,, | Performed by: NURSE PRACTITIONER

## 2024-11-04 PROCEDURE — 99214 OFFICE O/P EST MOD 30 MIN: CPT | Mod: PBBFAC,25 | Performed by: NURSE PRACTITIONER

## 2024-11-04 PROCEDURE — 3044F HG A1C LEVEL LT 7.0%: CPT | Mod: CPTII,,, | Performed by: NURSE PRACTITIONER

## 2024-11-04 PROCEDURE — 90656 IIV3 VACC NO PRSV 0.5 ML IM: CPT | Mod: PBBFAC

## 2024-11-04 PROCEDURE — 99214 OFFICE O/P EST MOD 30 MIN: CPT | Mod: S$PBB,,, | Performed by: NURSE PRACTITIONER

## 2024-11-04 PROCEDURE — 1160F RVW MEDS BY RX/DR IN RCRD: CPT | Mod: CPTII,,, | Performed by: NURSE PRACTITIONER

## 2024-11-04 PROCEDURE — 3078F DIAST BP <80 MM HG: CPT | Mod: CPTII,,, | Performed by: NURSE PRACTITIONER

## 2024-11-04 PROCEDURE — 3008F BODY MASS INDEX DOCD: CPT | Mod: CPTII,,, | Performed by: NURSE PRACTITIONER

## 2024-11-04 PROCEDURE — 3074F SYST BP LT 130 MM HG: CPT | Mod: CPTII,,, | Performed by: NURSE PRACTITIONER

## 2024-11-04 PROCEDURE — 99999 PR PBB SHADOW E&M-EST. PATIENT-LVL IV: CPT | Mod: PBBFAC,,, | Performed by: NURSE PRACTITIONER

## 2024-11-04 PROCEDURE — 73030 X-RAY EXAM OF SHOULDER: CPT | Mod: TC,LT

## 2024-11-04 PROCEDURE — 99999PBSHW PR PBB SHADOW TECHNICAL ONLY FILED TO HB: Mod: PBBFAC,,,

## 2024-11-04 PROCEDURE — 90677 PCV20 VACCINE IM: CPT | Mod: PBBFAC

## 2024-11-04 PROCEDURE — 90471 IMMUNIZATION ADMIN: CPT | Mod: PBBFAC

## 2024-11-04 RX ORDER — SYRINGE, DISPOSABLE, 1 ML
SYRINGE, EMPTY DISPOSABLE MISCELLANEOUS
COMMUNITY
Start: 2024-07-10

## 2024-11-04 RX ORDER — ERGOCALCIFEROL 1.25 MG/1
50000 CAPSULE ORAL
Qty: 4 CAPSULE | Refills: 1 | Status: SHIPPED | OUTPATIENT
Start: 2024-11-04 | End: 2025-01-03

## 2024-11-04 RX ADMIN — PNEUMOCOCCAL 20-VALENT CONJUGATE VACCINE 0.5 ML
2.2; 2.2; 2.2; 2.2; 2.2; 2.2; 2.2; 2.2; 2.2; 2.2; 2.2; 2.2; 2.2; 2.2; 2.2; 2.2; 4.4; 2.2; 2.2; 2.2 INJECTION, SUSPENSION INTRAMUSCULAR at 01:11

## 2024-11-04 RX ADMIN — INFLUENZA VIRUS VACCINE 0.5 ML: 15; 15; 15 SUSPENSION INTRAMUSCULAR at 01:11

## 2024-11-04 NOTE — PROGRESS NOTES
"[facility] Note    HPI:  Jonas Aguilar is a 29 y.o. female who presents today for Shoulder Pain (Left side shoulder pain for 2 weeks.  Patient states it started after the colonoscopy)         Review of Systems   Constitutional: Negative.    HENT: Negative.     Eyes: Negative.    Respiratory: Negative.     Cardiovascular: Negative.    Gastrointestinal: Negative.    Genitourinary: Negative.    Musculoskeletal:  Positive for joint pain (left shoulder).   Skin: Negative.    Neurological: Negative.    Endo/Heme/Allergies: Negative.    Psychiatric/Behavioral: Negative.        A review of systems was performed and was negative except as noted above.    I personally reviewed allergies, past medical, surgical, social and family history and updated as appropriate.    Medications:    Current Outpatient Medications:     albuterol (PROVENTIL/VENTOLIN HFA) 90 mcg/actuation inhaler, 2 puffs every 6 (six) hours as needed., Disp: , Rfl:     atomoxetine (STRATTERA) 80 MG capsule, Take 80 mg by mouth every morning., Disp: , Rfl:     BD LUER-GENARO SYRINGE 1 mL Syrg, use as directed, Disp: , Rfl:     cholecalciferol, vitamin D3, (VITAMIN D3) 2,000 unit Cap, Take 1 capsule by mouth once daily., Disp: , Rfl:     clindamycin (CLEOCIN T) 1 % lotion, Apply topically., Disp: , Rfl:     DULoxetine 40 mg CpDR, Take 2 capsules by mouth., Disp: , Rfl:     fexofenadine (ALLEGRA) 30 MG tablet, Take 30 mg by mouth 2 (two) times daily., Disp: , Rfl:     finasteride (PROSCAR) 5 mg tablet, Take 5 mg by mouth once daily., Disp: , Rfl:     gabapentin (NEURONTIN) 300 MG capsule, Take 300 mg by mouth 3 (three) times daily., Disp: , Rfl:     minoxidiL (LONITEN) 2.5 MG tablet, Take 2.5 mg by mouth every evening., Disp: , Rfl:     needle, disp, 18 G 18 gauge x 1" Ndle, BD Regular Bevel Needles 18 gauge x 1"  USE 1 EACH EVERY 7 DAYS TO DRAW UP TESTOTERONE, Disp: , Rfl:     syringe with needle 3 mL 21 gauge x 1 1/2" Syrg, BD Luer-Genaro Syringe 3 mL " "21 gauge x 1 "  USE 1 SYRINGE FOR TESTOSTERONE INJ EVERY 7 DAYS.(SYRINGE SIZE DIFFERENT/MD), Disp: , Rfl:     testosterone cypionate (DEPOTESTOTERONE CYPIONATE) 200 mg/mL injection, SMARTSI.4 Milliliter(s) IM Once a Week, Disp: , Rfl:     UNKNOWN TO PATIENT, PKG 3.5G VERONICA MILLER LAURENCE, Disp: , Rfl:     ergocalciferol (VITAMIN D2) 50,000 unit Cap, Take 1 capsule (50,000 Units total) by mouth every 7 days., Disp: 4 capsule, Rfl: 1    Current Facility-Administered Medications:     influenza (Flulaval, Fluzone, Fluarix) 45 mcg/0.5 mL IM vaccine (> or = 6 mo) 0.5 mL, 0.5 mL, Intramuscular, 1 time in Clinic/HOD,     levonorgestreL (LILETTA) 20.4 mcg/24 hr (8 yrs) 52 mg IUD 18.6 mcg, 18.6 mcg, Intrauterine, , , 18.6 mcg at 24 1330    medroxyPROGESTERone (DEPO-PROVERA) injection 150 mg, 150 mg, Intramuscular, 1 time in Clinic/HOD,     pneumoc 20-christy conj-dip cr(PF) (PREVNAR-20 (PF)) injection Syrg 0.5 mL, 0.5 mL, Intramuscular, 1 time in Clinic/HOD,      Health Maintenance:  Immunization History   Administered Date(s) Administered    DTP 1997, 1997, 1997, 1998    DTaP 2002    HIB 1997, 1997, 1997, 1998    HPV 9-Valent 2022    Hepatitis B, Pediatric/Adolescent 1997, 1997, 1997    IPV 2002    MMR 1998, 2002    Meningococcal Conjugate (MCV4P) 2008    OPV 1997, 1997, 1997    Tdap 2008, 2024    Varicella 1998, 2008      Health Maintenance   Topic Date Due    TETANUS VACCINE  2034    Hepatitis C Screening  Completed    Lipid Panel  Completed     Health Maintenance Topics with due status: Not Due       Topic Last Completion Date    TETANUS VACCINE 2024    RSV Vaccine (Age 60+ and Pregnant patients) Not Due     Health Maintenance Due   Topic Date Due    Pneumococcal Vaccines (Age 0-64) (1 of 2 - PCV) Never done    Influenza Vaccine (1) Never done    COVID-19 Vaccine " "(1 - 2024-25 season) Never done       PHYSICAL EXAM:  Vitals:    11/04/24 1331   BP: 121/68   Patient Position: Sitting   Pulse: 102   Temp: 98.1 °F (36.7 °C)   SpO2: 96%   Weight: 78 kg (172 lb)   Height: 5' 1" (1.549 m)     Body mass index is 32.5 kg/m².  Physical Exam  Constitutional:       Appearance: Normal appearance.   Cardiovascular:      Rate and Rhythm: Normal rate and regular rhythm.      Heart sounds: Normal heart sounds.   Pulmonary:      Effort: Pulmonary effort is normal.      Breath sounds: Normal breath sounds.   Neurological:      General: No focal deficit present.      Mental Status: She is alert and oriented to person, place, and time.   Psychiatric:         Mood and Affect: Mood normal.         Behavior: Behavior normal.          ASSESSMENT/PLAN:  1. Acute pain of left shoulder  -     XR Arthrogram Shoulder Left, COMPLETE CT to Follow (XPD); Future; Expected date: 11/04/2024    2. Need for influenza vaccination  -     influenza (Flulaval, Fluzone, Fluarix) 45 mcg/0.5 mL IM vaccine (> or = 6 mo) 0.5 mL    3. Need for pneumococcal 20-valent conjugate vaccination  -     pneumoc 20-christy conj-dip cr(PF) (PREVNAR-20 (PF)) injection Syrg 0.5 mL    Other orders  -     ergocalciferol (VITAMIN D2) 50,000 unit Cap; Take 1 capsule (50,000 Units total) by mouth every 7 days.  Dispense: 4 capsule; Refill: 1        Other than changes above, continue current medications and maintain follow up with specialists.      Follow up if symptoms worsen or fail to improve.   Recent Results (from the past 12 weeks)   Potassium    Collection Time: 09/20/24 10:03 AM   Result Value Ref Range    Potassium 3.9 3.5 - 5.1 mmol/L   Vitamin D    Collection Time: 09/20/24 10:03 AM   Result Value Ref Range    Vit D, 25-Hydroxy 20 (L) 30 - 96 ng/mL   TESTOSTERONE    Collection Time: 10/04/24  9:51 AM   Result Value Ref Range    Testosterone, Total 563 (H) 5 - 73 ng/dL   Lipid Panel    Collection Time: 10/04/24  9:51 AM   Result Value " Ref Range    Cholesterol 196 120 - 199 mg/dL    Triglycerides 148 30 - 150 mg/dL    HDL 37 (L) 40 - 75 mg/dL    LDL Cholesterol 129.4 63.0 - 159.0 mg/dL    HDL/Cholesterol Ratio 18.9 (L) 20.0 - 50.0 %    Total Cholesterol/HDL Ratio 5.3 (H) 2.0 - 5.0    Non-HDL Cholesterol 159 mg/dL   Comprehensive Metabolic Panel    Collection Time: 10/04/24  9:51 AM   Result Value Ref Range    Sodium 137 136 - 145 mmol/L    Potassium 3.8 3.5 - 5.1 mmol/L    Chloride 99 95 - 110 mmol/L    CO2 29 23 - 29 mmol/L    Glucose 84 70 - 110 mg/dL    BUN 13 6 - 20 mg/dL    Creatinine 1.2 0.5 - 1.4 mg/dL    Calcium 9.1 8.7 - 10.5 mg/dL    Total Protein 8.2 6.0 - 8.4 g/dL    Albumin 4.0 3.5 - 5.2 g/dL    Total Bilirubin 0.8 0.1 - 1.0 mg/dL    Alkaline Phosphatase 88 55 - 135 U/L    AST 14 10 - 40 U/L    ALT 24 10 - 44 U/L    eGFR >60.0 >60 mL/min/1.73 m^2    Anion Gap 9 3 - 11 mmol/L   CBC Auto Differential    Collection Time: 10/04/24  9:51 AM   Result Value Ref Range    WBC 8.43 3.90 - 12.70 K/uL    RBC 5.28 4.00 - 5.40 M/uL    Hemoglobin 15.5 12.0 - 16.0 g/dL    Hematocrit 44.9 37.0 - 48.5 %    MCV 85 82 - 98 fL    MCH 29.4 27.0 - 31.0 pg    MCHC 34.5 32.0 - 36.0 g/dL    RDW 12.2 11.5 - 14.5 %    Platelets 273 150 - 450 K/uL    MPV 10.7 9.2 - 12.9 fL    Immature Granulocytes 0.1 0.0 - 0.5 %    Gran # (ANC) 4.1 1.8 - 7.7 K/uL    Immature Grans (Abs) 0.01 0.00 - 0.04 K/uL    Lymph # 3.2 1.0 - 4.8 K/uL    Mono # 0.9 0.3 - 1.0 K/uL    Eos # 0.2 0.0 - 0.5 K/uL    Baso # 0.06 0.00 - 0.20 K/uL    nRBC 0 0 /100 WBC    Gran % 48.9 38.0 - 73.0 %    Lymph % 37.4 18.0 - 48.0 %    Mono % 10.9 4.0 - 15.0 %    Eosinophil % 2.0 0.0 - 8.0 %    Basophil % 0.7 0.0 - 1.9 %    Differential Method Automated          GORDON Castro  [facility]

## 2024-11-05 DIAGNOSIS — M25.512 ACUTE PAIN OF LEFT SHOULDER: Primary | ICD-10-CM

## 2024-11-13 ENCOUNTER — OFFICE VISIT (OUTPATIENT)
Dept: ORTHOPEDICS | Facility: CLINIC | Age: 29
End: 2024-11-13
Payer: MEDICAID

## 2024-11-13 VITALS
HEART RATE: 100 BPM | SYSTOLIC BLOOD PRESSURE: 129 MMHG | HEIGHT: 61 IN | OXYGEN SATURATION: 97 % | DIASTOLIC BLOOD PRESSURE: 84 MMHG | WEIGHT: 170.88 LBS | RESPIRATION RATE: 18 BRPM | BODY MASS INDEX: 32.26 KG/M2

## 2024-11-13 DIAGNOSIS — M77.8 SHOULDER TENDINITIS, LEFT: Primary | ICD-10-CM

## 2024-11-13 DIAGNOSIS — M25.512 ACUTE PAIN OF LEFT SHOULDER: ICD-10-CM

## 2024-11-13 PROCEDURE — 1159F MED LIST DOCD IN RCRD: CPT | Mod: CPTII,,, | Performed by: NURSE PRACTITIONER

## 2024-11-13 PROCEDURE — 99214 OFFICE O/P EST MOD 30 MIN: CPT | Mod: PBBFAC | Performed by: NURSE PRACTITIONER

## 2024-11-13 PROCEDURE — 99999 PR PBB SHADOW E&M-EST. PATIENT-LVL IV: CPT | Mod: PBBFAC,,, | Performed by: NURSE PRACTITIONER

## 2024-11-13 PROCEDURE — 3074F SYST BP LT 130 MM HG: CPT | Mod: CPTII,,, | Performed by: NURSE PRACTITIONER

## 2024-11-13 PROCEDURE — 1160F RVW MEDS BY RX/DR IN RCRD: CPT | Mod: CPTII,,, | Performed by: NURSE PRACTITIONER

## 2024-11-13 PROCEDURE — 3008F BODY MASS INDEX DOCD: CPT | Mod: CPTII,,, | Performed by: NURSE PRACTITIONER

## 2024-11-13 PROCEDURE — 3079F DIAST BP 80-89 MM HG: CPT | Mod: CPTII,,, | Performed by: NURSE PRACTITIONER

## 2024-11-13 PROCEDURE — 99203 OFFICE O/P NEW LOW 30 MIN: CPT | Mod: S$PBB,,, | Performed by: NURSE PRACTITIONER

## 2024-11-13 PROCEDURE — 3044F HG A1C LEVEL LT 7.0%: CPT | Mod: CPTII,,, | Performed by: NURSE PRACTITIONER

## 2024-11-13 PROCEDURE — 97110 THERAPEUTIC EXERCISES: CPT | Mod: ,,, | Performed by: NURSE PRACTITIONER

## 2024-11-14 ENCOUNTER — OFFICE VISIT (OUTPATIENT)
Dept: OBSTETRICS AND GYNECOLOGY | Facility: CLINIC | Age: 29
End: 2024-11-14
Payer: MEDICAID

## 2024-11-14 VITALS
HEART RATE: 78 BPM | BODY MASS INDEX: 32.1 KG/M2 | HEIGHT: 61 IN | DIASTOLIC BLOOD PRESSURE: 84 MMHG | WEIGHT: 170 LBS | SYSTOLIC BLOOD PRESSURE: 132 MMHG

## 2024-11-14 DIAGNOSIS — N92.6 IRREGULAR MENSTRUAL CYCLE: Primary | ICD-10-CM

## 2024-11-14 DIAGNOSIS — N92.1 BREAKTHROUGH BLEEDING ASSOCIATED WITH INTRAUTERINE DEVICE (IUD): ICD-10-CM

## 2024-11-14 DIAGNOSIS — F32.A DEPRESSION, UNSPECIFIED DEPRESSION TYPE: ICD-10-CM

## 2024-11-14 DIAGNOSIS — K58.8 OTHER IRRITABLE BOWEL SYNDROME: ICD-10-CM

## 2024-11-14 DIAGNOSIS — Z97.5 BREAKTHROUGH BLEEDING ASSOCIATED WITH INTRAUTERINE DEVICE (IUD): ICD-10-CM

## 2024-11-14 DIAGNOSIS — Z97.5 IUD (INTRAUTERINE DEVICE) IN PLACE: ICD-10-CM

## 2024-11-14 DIAGNOSIS — Z78.9 FEMALE-TO-MALE TRANSGENDER PERSON: ICD-10-CM

## 2024-11-14 PROCEDURE — 99214 OFFICE O/P EST MOD 30 MIN: CPT | Mod: PBBFAC | Performed by: OBSTETRICS & GYNECOLOGY

## 2024-11-14 PROCEDURE — 99999 PR PBB SHADOW E&M-EST. PATIENT-LVL IV: CPT | Mod: PBBFAC,,, | Performed by: OBSTETRICS & GYNECOLOGY

## 2024-11-14 NOTE — PROGRESS NOTES
Subjective:    Patient ID: Jonas Aguilar is a 29 y.o. y.o. female    Chief Complaint:   Chief Complaint   Patient presents with    Follow-up     Discuss hysterectomy        History of Present Illness:  Jonas presents today for discussion of hysterectomy. He has been on testosterone therapy since age 17 in order to transition to male gender.  During the process he was placed on Depo-Provera to help control cycles.  Developed bleeding almost daily with the Depo-Provera.  Back in June a Mirena IUD was placed in order to help with bleeding.  It has helped some but is still not an ideal situation.  He would like permanent/definitive management in the form of hysterectomy.    He had begun this process in [place] and was scheduled for hysterectomy but moved back home before he could have the surgery.  He recently had workup from GI due to some abdominal pain and he has ultimately been diagnosed with IBS-C.    Wants to keep ovaries at this time      Review of Systems   Constitutional:  Negative for chills and fever.   Respiratory:  Negative for shortness of breath.    Cardiovascular:  Negative for chest pain.   Gastrointestinal:  Positive for constipation.   Genitourinary:  Positive for menstrual problem.   Neurological:  Negative for headaches.         Objective:    Vital Signs:  Vitals:    11/14/24 1101   BP: 132/84   Pulse: 78     Wt Readings from Last 1 Encounters:   11/14/24 77.1 kg (170 lb)     Body mass index is 32.12 kg/m².    Physical Exam:  General:  alert, no distress   Skin:  Skin color, texture, turgor normal. No rashes or lesions   Abdomen:  Soft, nontender   Extremities: No cyanosis, clubbing, edema       Procedure explained including all risks, benefits, alternatives and he desires to proceed.  All questions answered and consent obtained.  We will plan for ovarian preservation    I spent a total of 20 minutes on the day of the visit.  This includes face to face time and non-face to face time  preparing to see the patient (eg, review of tests), obtaining and/or reviewing separately obtained history, documenting clinical information in the electronic or other health record, independently interpreting results and communicating results to the patient/family/caregiver, or care coordinator.           Assessment:      1. Irregular menstrual cycle    2. Depression, unspecified depression type    3. Breakthrough bleeding associated with intrauterine device (IUD)    4. IUD (intrauterine device) in place    5. Female-to-male transgender person    6. Other irritable bowel syndrome          Plan:      Irregular menstrual cycle  -     Case Request Operating Room: HYSTERECTOMY,TOTAL,LAPAROSCOPIC,WITH SALPINGECTOMY    Depression, unspecified depression type    Breakthrough bleeding associated with intrauterine device (IUD)  -     Case Request Operating Room: HYSTERECTOMY,TOTAL,LAPAROSCOPIC,WITH SALPINGECTOMY    IUD (intrauterine device) in place  -     Case Request Operating Room: HYSTERECTOMY,TOTAL,LAPAROSCOPIC,WITH SALPINGECTOMY    Female-to-male transgender person    Other irritable bowel syndrome           Corrine Hernandez MD, FACOG   11/14/2024 11:07 AM

## 2024-11-29 ENCOUNTER — OFFICE VISIT (OUTPATIENT)
Dept: OPTOMETRY | Facility: CLINIC | Age: 29
End: 2024-11-29
Payer: MEDICAID

## 2024-11-29 DIAGNOSIS — H52.13 MYOPIA OF BOTH EYES WITH REGULAR ASTIGMATISM: Primary | ICD-10-CM

## 2024-11-29 DIAGNOSIS — H47.10 PAPILLEDEMA: ICD-10-CM

## 2024-11-29 DIAGNOSIS — H52.223 MYOPIA OF BOTH EYES WITH REGULAR ASTIGMATISM: Primary | ICD-10-CM

## 2024-11-29 PROCEDURE — 1159F MED LIST DOCD IN RCRD: CPT | Mod: CPTII,,, | Performed by: OPTOMETRIST

## 2024-11-29 PROCEDURE — 99213 OFFICE O/P EST LOW 20 MIN: CPT | Mod: PBBFAC,PN | Performed by: OPTOMETRIST

## 2024-11-29 PROCEDURE — 99999 PR PBB SHADOW E&M-EST. PATIENT-LVL III: CPT | Mod: PBBFAC,,, | Performed by: OPTOMETRIST

## 2024-11-29 PROCEDURE — 3044F HG A1C LEVEL LT 7.0%: CPT | Mod: CPTII,,, | Performed by: OPTOMETRIST

## 2024-11-29 PROCEDURE — 92004 COMPRE OPH EXAM NEW PT 1/>: CPT | Mod: S$PBB,,, | Performed by: OPTOMETRIST

## 2024-12-02 NOTE — PROGRESS NOTES
HPI    SAI: 2 years  Chief complaint (CC): 28 yo F presents today for ocular health check. Pt   reports no vision complaints. Pt denies eye pain, floaters and flashes.   Glasses? +  Contacts? -  H/o eye surgery, injections or laser: -  H/o eye injury: -  Known eye conditions? Papilledema  Family h/o eye conditions? -  Eye gtts? -      (-) Flashes (-)  Floaters (-) Mucous   (-)  Tearing (-) Itching (-) Burning   (-) Headaches (-) Eye Pain/discomfort (-) Irritation   (-)  Redness (-) Double vision (-) Blurry vision    Diabetic? -  A1c? -     Last edited by Sofya Babcock MA on 11/29/2024  9:33 AM.            Assessment /Plan     For exam results, see Encounter Report.    Myopia of both eyes with regular astigmatism    Papilledema  -     Ambulatory referral/consult to Ophthalmology      MONITOR. ED PT ON ALL EXAM FINDINGS  RX FINAL SPECS; OK TO CONTINUE WITH HABITUAL SPECS   H/O IIH W/ PAPILLEDEMA; REPORTED H/O LUMBAR PUNCTURE IN ST[facility] YEARS AGO; WANT TO CONTINUE MONITORING   OCULAR HEALTH WNL OD, OS; NO EVIDENCE OF DISC EDEMA, PALLOR, OR HEMES; CONTINUE TO MONITOR.   RTC FOR BASELINE TESTING VF/OCT (RNFL)  PT LIVES IN [place]; PROVIDED INFO FOR NEURO AT [facility] AND [facility] FOR MGMT/MONITORING  INSTRUCTED PT TO NOTIFY US IF INTERESTED IN STAYING W/I [facility]; IF SO, SCHEDULE W/ NEURO-OPHTH FOR CONSULT PER REQUEST; MONITOR.  RTC 1 YR//PRN FOR REE/DFE

## 2024-12-23 ENCOUNTER — OFFICE VISIT (OUTPATIENT)
Dept: ORTHOPEDICS | Facility: CLINIC | Age: 29
End: 2024-12-23
Payer: MEDICAID

## 2024-12-23 DIAGNOSIS — M25.512 ACUTE PAIN OF LEFT SHOULDER: Primary | ICD-10-CM

## 2024-12-23 DIAGNOSIS — M77.8 SHOULDER TENDINITIS, LEFT: ICD-10-CM

## 2024-12-23 PROCEDURE — 1160F RVW MEDS BY RX/DR IN RCRD: CPT | Mod: CPTII,,, | Performed by: NURSE PRACTITIONER

## 2024-12-23 PROCEDURE — 1159F MED LIST DOCD IN RCRD: CPT | Mod: CPTII,,, | Performed by: NURSE PRACTITIONER

## 2024-12-23 PROCEDURE — 99212 OFFICE O/P EST SF 10 MIN: CPT | Mod: PBBFAC | Performed by: NURSE PRACTITIONER

## 2024-12-23 PROCEDURE — 99213 OFFICE O/P EST LOW 20 MIN: CPT | Mod: S$PBB,,, | Performed by: NURSE PRACTITIONER

## 2024-12-23 PROCEDURE — 99999 PR PBB SHADOW E&M-EST. PATIENT-LVL II: CPT | Mod: PBBFAC,,, | Performed by: NURSE PRACTITIONER

## 2024-12-23 PROCEDURE — 3044F HG A1C LEVEL LT 7.0%: CPT | Mod: CPTII,,, | Performed by: NURSE PRACTITIONER

## 2024-12-23 NOTE — PROGRESS NOTES
Subjective:      6 week Follow up visit: Left shoulder pain:     Jonas Aguilar is a 29 y.o. right handed female (trans to male)  presents for follow up for left sided shoulder pain. Patient has completed 6 weeks of conservative treatment, has done the Tylenol and physician directed exercises as reviewed.  Patient states that there is improved ROM but continues with pain with certain motions. The pain level is rated as 3/10 in the left shoulder but can be much worse with activity. The pain is over the lateral shoulder and anterior shoulder as previous. Symptoms are aggravated by lifting, ADL's, repetitive use and over head motions.      Medical History:       Past Medical History:   Diagnosis Date    ADHD (attention deficit hyperactivity disorder)      Anxiety      Asthma      Bipolar 1 disorder 06/10/2022    Connective tissue disorder      Depression      Fibromyalgia      Gender dysphoria      Gender identity disorder      MCTD (mixed connective tissue disease)      Papilledema      Sciatica      Vitamin D deficiency disease 06/30/2014         Surgical History:        Past Surgical History:   Procedure Laterality Date    COLONOSCOPY N/A 10/24/2024     Procedure: COLONOSCOPY;  Surgeon: Terry Brennan MD;  [facility];  Service: Endoscopy;  Laterality: N/A;    LIPOSUCTION        MASTECTOMY        wisdom teeth removal             Family History:         Family History   Problem Relation Name Age of Onset    Colon cancer Father   70    Cancer Maternal Grandmother             breast         Allergies:        Review of patient's allergies indicates:   Allergen Reactions    Lactose Diarrhea         Review of Systems   Constitutional: Negative.  Negative for fever.   Musculoskeletal:  Positive for joint pain.   Skin: Negative.    Neurological: Negative.    Psychiatric/Behavioral: depression, anxiety  All other systems reviewed and are negative.      Objective:   NVI  General:  alert, appears stated age, and  cooperative   Gait:  Normal.       Left Shoulder  Bruising:   absent   Crepitus:  absent   Joint Tenderness:  lateral acromion, rotator cuff   Active ROM:   AROM was improved, not end range   Neer:   positive- mild   Pedro  positive - mild   Speeds negative   Cross arm negative   Empty can positive    Drop arm negative   Stability:  normal   Apprehension Sign:  negative   Atrophy:   none noted   Strength:  biceps 5/5, triceps 5/5, abduction 4/5, adduction 4/5    Contralateral shoulder was without deficit  Brisk refill noted.      Imaging  none        Assessment:      LT shoulder pain, suspected tendonitis     Plan:      Mild clinical improvement with 6 weeks of physician directed exercises and Tylenol.    Continue the left Shoulder HEP.   3.   Will hold off of MRI for now due to upcoming surgical procedure next week.   4.   Ice and over head rest as directed. Continue Tylenol as previous.   5.   RTC 4 weeks, will get MRI if needed.  6.   All questions were answered.

## 2024-12-30 ENCOUNTER — HOSPITAL ENCOUNTER (OUTPATIENT)
Dept: PREADMISSION TESTING | Facility: HOSPITAL | Age: 29
Discharge: HOME | End: 2024-12-30
Attending: OBSTETRICS & GYNECOLOGY
Payer: MEDICAID

## 2024-12-30 VITALS — BODY MASS INDEX: 33.04 KG/M2 | WEIGHT: 175 LBS | HEIGHT: 61 IN

## 2024-12-30 DIAGNOSIS — F41.9 ANXIETY: ICD-10-CM

## 2024-12-30 DIAGNOSIS — Z78.9 FEMALE-TO-MALE TRANSGENDER PERSON: ICD-10-CM

## 2024-12-30 DIAGNOSIS — N92.6 IRREGULAR MENSTRUAL CYCLE: Primary | ICD-10-CM

## 2024-12-30 DIAGNOSIS — Z97.5 BREAKTHROUGH BLEEDING ASSOCIATED WITH INTRAUTERINE DEVICE (IUD): ICD-10-CM

## 2024-12-30 DIAGNOSIS — N92.1 BREAKTHROUGH BLEEDING ASSOCIATED WITH INTRAUTERINE DEVICE (IUD): ICD-10-CM

## 2024-12-30 RX ORDER — MUPIROCIN 20 MG/G
OINTMENT TOPICAL
Status: CANCELLED | OUTPATIENT
Start: 2024-12-30

## 2024-12-30 RX ORDER — LIDOCAINE HYDROCHLORIDE 10 MG/ML
0.5 INJECTION, SOLUTION EPIDURAL; INFILTRATION; INTRACAUDAL; PERINEURAL
Status: CANCELLED | OUTPATIENT
Start: 2024-12-30

## 2024-12-30 RX ORDER — HYDROCODONE BITARTRATE AND ACETAMINOPHEN 5; 325 MG/1; MG/1
1 TABLET ORAL EVERY 4 HOURS PRN
Status: CANCELLED | OUTPATIENT
Start: 2024-12-30

## 2024-12-30 RX ORDER — ONDANSETRON 4 MG/1
8 TABLET, ORALLY DISINTEGRATING ORAL EVERY 8 HOURS PRN
Status: CANCELLED | OUTPATIENT
Start: 2024-12-30

## 2024-12-30 RX ORDER — CEFAZOLIN SODIUM 2 G/50ML
2 SOLUTION INTRAVENOUS
Status: CANCELLED | OUTPATIENT
Start: 2024-12-30

## 2024-12-30 RX ORDER — IBUPROFEN 800 MG/1
800 TABLET ORAL
Status: CANCELLED | OUTPATIENT
Start: 2024-12-31

## 2024-12-30 RX ORDER — DIPHENHYDRAMINE HCL 25 MG
25 CAPSULE ORAL EVERY 4 HOURS PRN
Status: CANCELLED | OUTPATIENT
Start: 2024-12-30

## 2024-12-30 RX ORDER — AMOXICILLIN 250 MG
1 CAPSULE ORAL 2 TIMES DAILY
Status: CANCELLED | OUTPATIENT
Start: 2024-12-30

## 2024-12-30 RX ORDER — PROCHLORPERAZINE EDISYLATE 5 MG/ML
5 INJECTION INTRAMUSCULAR; INTRAVENOUS EVERY 6 HOURS PRN
Status: CANCELLED | OUTPATIENT
Start: 2024-12-30

## 2024-12-30 RX ORDER — DIPHENHYDRAMINE HYDROCHLORIDE 50 MG/ML
25 INJECTION INTRAMUSCULAR; INTRAVENOUS EVERY 4 HOURS PRN
Status: CANCELLED | OUTPATIENT
Start: 2024-12-30

## 2024-12-30 RX ORDER — SODIUM CHLORIDE, SODIUM LACTATE, POTASSIUM CHLORIDE, CALCIUM CHLORIDE 600; 310; 30; 20 MG/100ML; MG/100ML; MG/100ML; MG/100ML
INJECTION, SOLUTION INTRAVENOUS CONTINUOUS
Status: CANCELLED | OUTPATIENT
Start: 2024-12-30

## 2024-12-30 RX ORDER — MEPERIDINE HYDROCHLORIDE 25 MG/ML
12.5 INJECTION INTRAMUSCULAR; INTRAVENOUS; SUBCUTANEOUS ONCE AS NEEDED
Status: CANCELLED | OUTPATIENT
Start: 2024-12-30 | End: 2024-12-31

## 2024-12-30 RX ORDER — PROCHLORPERAZINE EDISYLATE 5 MG/ML
5 INJECTION INTRAMUSCULAR; INTRAVENOUS EVERY 10 MIN PRN
Status: CANCELLED | OUTPATIENT
Start: 2024-12-30

## 2024-12-30 RX ORDER — HYDROCODONE BITARTRATE AND ACETAMINOPHEN 10; 325 MG/1; MG/1
1 TABLET ORAL EVERY 4 HOURS PRN
Status: CANCELLED | OUTPATIENT
Start: 2024-12-30

## 2024-12-30 RX ORDER — ACETAMINOPHEN 325 MG/1
650 TABLET ORAL EVERY 4 HOURS PRN
Status: CANCELLED | OUTPATIENT
Start: 2024-12-30

## 2024-12-30 RX ORDER — ONDANSETRON HYDROCHLORIDE 2 MG/ML
4 INJECTION, SOLUTION INTRAVENOUS DAILY PRN
Status: CANCELLED | OUTPATIENT
Start: 2024-12-30

## 2024-12-30 RX ORDER — KETOROLAC TROMETHAMINE 30 MG/ML
30 INJECTION, SOLUTION INTRAMUSCULAR; INTRAVENOUS
Status: CANCELLED | OUTPATIENT
Start: 2024-12-30 | End: 2024-12-31

## 2024-12-30 RX ORDER — FAMOTIDINE 20 MG/1
20 TABLET, FILM COATED ORAL
Status: CANCELLED | OUTPATIENT
Start: 2024-12-30

## 2024-12-30 RX ORDER — HYDROMORPHONE HYDROCHLORIDE 1 MG/ML
1 INJECTION, SOLUTION INTRAMUSCULAR; INTRAVENOUS; SUBCUTANEOUS EVERY 4 HOURS PRN
Status: CANCELLED | OUTPATIENT
Start: 2024-12-30

## 2024-12-30 RX ORDER — HYDROMORPHONE HYDROCHLORIDE 0.2 MG/ML
0.2 INJECTION INTRAMUSCULAR; INTRAVENOUS; SUBCUTANEOUS EVERY 5 MIN PRN
Status: CANCELLED | OUTPATIENT
Start: 2024-12-30

## 2024-12-30 RX ORDER — SODIUM CHLORIDE 0.9 % (FLUSH) 0.9 %
3 SYRINGE (ML) INJECTION
Status: CANCELLED | OUTPATIENT
Start: 2024-12-30

## 2024-12-30 RX ORDER — POLYETHYLENE GLYCOL 3350 17 G/17G
17 POWDER, FOR SOLUTION ORAL DAILY
Status: CANCELLED | OUTPATIENT
Start: 2024-12-30

## 2024-12-30 NOTE — DISCHARGE INSTRUCTIONS
"         PREPARING SKIN BEFORE SURGERY      Preparing skin before surgery can reduce the risk of infection at surgical site. To make the process easier this facility has chosen disposable cloths moistened with rinse free 2% Chlorhexidine Gluconate antiseptic solution. The steps below outline the prepping process and should be carefully followed.        DIRECTIONS:     Shower or bathe the night prior to surgery, wait at least 2 hours before prepping skin with disposable cloths. Once  prepping begins do not apply lotions, moisturizers or make up.         * Open prep wipes by holding top of package in one hand and and lift flap on backside with the other hand.      * Prep from the jaw line down using all cloths in package.       * Avoid contact with eyes, ears and mouth.     Prep is used on:    1. Neck, shoulders, arms, underarms and chest.    2. Both hands, between fingers.    3. Abdomen, groin and perineum.     4. Right leg, foot and between toes.    5. Left leg, foot and between toes.    6. Back, back of neck and buttocks.     Allow areas to air dry 1 minute. Do not wipe off or rinse. It is normal for the skin to have a temporary "tacky" feel for several minutes after the antiseptic solutions is applied.     Shower or bathe the morning of surgery.         PREPARING SKIN BEFORE SURGERY      Preparing skin before surgery can reduce the risk of infection at surgical site. To make the process easier this facility has chosen disposable cloths moistened with rinse free 2% Chlorhexidine Gluconate antiseptic solution. The steps below outline the prepping process and should be carefully followed.        DIRECTIONS:     Shower or bathe the night prior to surgery, wait at least 2 hours before prepping skin with disposable cloths. Once  prepping begins do not apply lotions, moisturizers or make up.         * Open prep wipes by holding top of package in one hand and and lift flap on backside with the other hand.      * Prep from " "the jaw line down using all cloths in package.       * Avoid contact with eyes, ears and mouth.     Prep is used on:    1. Neck, shoulders, arms, underarms and chest.    2. Both hands, between fingers.    3. Abdomen, groin and perineum.     4. Right leg, foot and between toes.    5. Left leg, foot and between toes.    6. Back, back of neck and buttocks.     Allow areas to air dry 1 minute. Do not wipe off or rinse. It is normal for the skin to have a temporary "tacky" feel for several minutes after the antiseptic solutions is applied.     Shower or bathe the morning of surgery.         PREPARING SKIN BEFORE SURGERY      Preparing skin before surgery can reduce the risk of infection at surgical site. To make the process easier this facility has chosen disposable cloths moistened with rinse free 2% Chlorhexidine Gluconate antiseptic solution. The steps below outline the prepping process and should be carefully followed.        DIRECTIONS:     Shower or bathe the night prior to surgery, wait at least 2 hours before prepping skin with disposable cloths. Once  prepping begins do not apply lotions, moisturizers or make up.         * Open prep wipes by holding top of package in one hand and and lift flap on backside with the other hand.      * Prep from the jaw line down using all cloths in package.       * Avoid contact with eyes, ears and mouth.     Prep is used on:    1. Neck, shoulders, arms, underarms and chest.    2. Both hands, between fingers.    3. Abdomen, groin and perineum.     4. Right leg, foot and between toes.    5. Left leg, foot and between toes.    6. Back, back of neck and buttocks.     Allow areas to air dry 1 minute. Do not wipe off or rinse. It is normal for the skin to have a temporary "tacky" feel for several minutes after the antiseptic solutions is applied.     Shower or bathe the morning of surgery.         PREPARING SKIN BEFORE SURGERY      Preparing skin before surgery can reduce the risk of " "infection at surgical site. To make the process easier this facility has chosen disposable cloths moistened with rinse free 2% Chlorhexidine Gluconate antiseptic solution. The steps below outline the prepping process and should be carefully followed.        DIRECTIONS:     Shower or bathe the night prior to surgery, wait at least 2 hours before prepping skin with disposable cloths. Once  prepping begins do not apply lotions, moisturizers or make up.         * Open prep wipes by holding top of package in one hand and and lift flap on backside with the other hand.      * Prep from the jaw line down using all cloths in package.       * Avoid contact with eyes, ears and mouth.     Prep is used on:    1. Neck, shoulders, arms, underarms and chest.    2. Both hands, between fingers.    3. Abdomen, groin and perineum.     4. Right leg, foot and between toes.    5. Left leg, foot and between toes.    6. Back, back of neck and buttocks.     Allow areas to air dry 1 minute. Do not wipe off or rinse. It is normal for the skin to have a temporary "tacky" feel for several minutes after the antiseptic solutions is applied.     Shower or bathe the morning of surgery.BEFORE THE PROCEDURE:    REPORT ANY CHANGE IN YOUR PHYSICAL CONDITION TO YOUR DOCTOR IMMEDIATELY.  SELF ISOLATE AND CHECK TEMPERATURE DAILY, IF TEMP OVER 100, CALL PHYSICIAN IMMEDIATELY.  TRY TO REFRAIN FROM SMOKING AND ALCOHOL 72 HOURS BEFORE YOUR PROCEDURE.   DO NOT EAT AFTER MIDNIGHT THE NIGHT BEFORE YOUR PROCEDURE. DO NOT DRINK ANYTHING AFTER MIDNIGHT EXCEPT YOU CAN HAVE WATER ONLY UP TO 4 HOURS PRIOR TO ARRIVAL.  NO MAKE UP, NAIL POLISH OR JEWELRY.      SURGERY PREP INSTRUCTIONS    SOMEONE WILL CALL YOU THE DAY BEFORE YOUR PROCEDURE WITH A CHECK-IN TIME FOR YOUR PROCEDURE.    DAY OF YOUR PROCEDURE:    TAKE BLOOD PRESSURE MEDICATIONS THE MORNING OF YOUR PROCEDURE, WITH SMALL SIPS WATER, AS DIRECTED BY YOUR PHYSICIAN.   DO NOT TAKE ANY DIABETIC MEDICATIONS UNLESS " DIRECTED TO DO SO BY YOUR PHYSICIAN.   CONTACT LENSES AND DENTURES MUST BE REMOVED.  A RESPONSIBLE ADULT MUST ACCOMPANY YOU HOME UPON DISCHARGE.   ONLY 1 VISITOR ALLOWED PER ROOM.     YOUR THOUGHTS AND OPINIONS HELP US TO BETTER SERVE YOU.     PLEASE PARTICIPATE IN SURVEYS ABOUT YOUR CARE.    THANK YOU FOR CHOOSING [facility].

## 2024-12-31 ENCOUNTER — ANESTHESIA EVENT (OUTPATIENT)
Dept: SURGERY | Facility: HOSPITAL | Age: 29
End: 2024-12-31
Payer: MEDICAID

## 2024-12-31 NOTE — ANESTHESIA PREPROCEDURE EVALUATION
12/31/2024  Jonas Aguilar is a 29 y.o., female.      Pre-op Assessment    I have reviewed the Patient Summary Reports.    I have reviewed the NPO Status.   I have reviewed the Medications.     Review of Systems  Anesthesia Hx:  No problems with previous Anesthesia             Denies Family Hx of Anesthesia complications.   Personal Hx of Anesthesia complications, Post-Operative Nausea/Vomiting, with every anesthetic, treatment not known                    Social:  Non-Smoker       Cardiovascular:  Cardiovascular Normal                                              Pulmonary:    Asthma mild                   Renal/:  Renal/ Normal                 Hepatic/GI:     GERD                Musculoskeletal:     CONNECTIVE TISSUE DIORDER            Neurological:  Neurology Normal          FIBROMYALGIA  SCIATICA                            Endocrine:  Endocrine Normal            Psych:  Psychiatric History anxiety depression ADHD BIPOLAR             Lab Results   Component Value Date    WBC 9.55 12/30/2024    HGB 16.3 (H) 12/30/2024    HCT 47.6 12/30/2024    MCV 86 12/30/2024     12/30/2024      CMP  Sodium   Date Value Ref Range Status   12/30/2024 142 136 - 145 mmol/L Final     Potassium   Date Value Ref Range Status   12/30/2024 3.7 3.5 - 5.1 mmol/L Final     Chloride   Date Value Ref Range Status   12/30/2024 104 95 - 110 mmol/L Final     CO2   Date Value Ref Range Status   12/30/2024 32 (H) 23 - 29 mmol/L Final     Glucose   Date Value Ref Range Status   12/30/2024 100 70 - 110 mg/dL Final     BUN   Date Value Ref Range Status   12/30/2024 9 6 - 20 mg/dL Final     Creatinine   Date Value Ref Range Status   12/30/2024 1.1 0.5 - 1.4 mg/dL Final     Calcium   Date Value Ref Range Status   12/30/2024 9.1 8.7 - 10.5 mg/dL Final     Total Protein   Date Value Ref Range Status   12/30/2024 7.1 6.0 - 8.4 g/dL  Final     Albumin   Date Value Ref Range Status   12/30/2024 3.5 3.5 - 5.2 g/dL Final     Total Bilirubin   Date Value Ref Range Status   12/30/2024 0.3 0.1 - 1.0 mg/dL Final     Comment:     For infants and newborns, interpretation of results should be based  on gestational age, weight and in agreement with clinical  observations.    Premature Infant recommended reference ranges:  Up to 24 hours.............<8.0 mg/dL  Up to 48 hours............<12.0 mg/dL  3-5 days..................<15.0 mg/dL  6-29 days.................<15.0 mg/dL    For patients on Eltrombopag therapy, use of Dimension [place] TBIL is   not   recommended.       Alkaline Phosphatase   Date Value Ref Range Status   12/30/2024 83 55 - 135 U/L Final     AST   Date Value Ref Range Status   12/30/2024 18 10 - 40 U/L Final     ALT   Date Value Ref Range Status   12/30/2024 29 10 - 44 U/L Final     Anion Gap   Date Value Ref Range Status   12/30/2024 6 3 - 11 mmol/L Final     eGFR   Date Value Ref Range Status   12/30/2024 >60.0 >60 mL/min/1.73 m^2 Final        Physical Exam  General: Well nourished    Airway:  Mallampati: III / II  Mouth Opening: Normal  TM Distance: Normal  Tongue: Normal  Neck ROM: Normal ROM    Dental:  Intact    Chest/Lungs:  Clear to auscultation    Heart:  Rate: Normal  Rhythm: Regular Rhythm  Sounds: Normal        Anesthesia Plan  Type of Anesthesia, risks & benefits discussed:    Anesthesia Type: Gen ETT  Intra-op Monitoring Plan: Standard ASA Monitors  Post Op Pain Control Plan: multimodal analgesia  Induction:  IV  Airway Plan: Direct  Informed Consent: Informed consent signed with the Patient and all parties understand the risks and agree with anesthesia plan.  All questions answered.   ASA Score: 3  Day of Surgery Review of History & Physical: I have interviewed and examined the patient. I have reviewed the patient's H&P dated: There are no significant changes.     Ready For Surgery From Anesthesia Perspective.     .

## 2025-01-02 ENCOUNTER — ANESTHESIA (OUTPATIENT)
Dept: SURGERY | Facility: HOSPITAL | Age: 30
End: 2025-01-02
Payer: MEDICAID

## 2025-01-02 ENCOUNTER — HOSPITAL ENCOUNTER (OUTPATIENT)
Facility: HOSPITAL | Age: 30
Discharge: HOME | End: 2025-01-03
Attending: OBSTETRICS & GYNECOLOGY | Admitting: OBSTETRICS & GYNECOLOGY
Payer: MEDICAID

## 2025-01-02 DIAGNOSIS — Z97.5 BREAKTHROUGH BLEEDING ASSOCIATED WITH INTRAUTERINE DEVICE (IUD): ICD-10-CM

## 2025-01-02 DIAGNOSIS — N92.6 IRREGULAR MENSES: Primary | ICD-10-CM

## 2025-01-02 DIAGNOSIS — N92.6 IRREGULAR MENSTRUAL CYCLE: ICD-10-CM

## 2025-01-02 DIAGNOSIS — N92.1 BREAKTHROUGH BLEEDING ASSOCIATED WITH INTRAUTERINE DEVICE (IUD): ICD-10-CM

## 2025-01-02 DIAGNOSIS — Z78.9 FEMALE-TO-MALE TRANSGENDER PERSON: ICD-10-CM

## 2025-01-02 DIAGNOSIS — G89.18 POST-OP PAIN: ICD-10-CM

## 2025-01-02 DIAGNOSIS — F41.9 ANXIETY: ICD-10-CM

## 2025-01-02 LAB
ABO + RH BLD: NORMAL
B-HCG UR QL: NEGATIVE
BLD GP AB SCN CELLS X3 SERPL QL: NORMAL
SPECIMEN OUTDATE: NORMAL

## 2025-01-02 PROCEDURE — 71000033 HC RECOVERY, INTIAL HOUR: Performed by: OBSTETRICS & GYNECOLOGY

## 2025-01-02 PROCEDURE — 36000711: Performed by: OBSTETRICS & GYNECOLOGY

## 2025-01-02 PROCEDURE — 81025 URINE PREGNANCY TEST: CPT | Performed by: OBSTETRICS & GYNECOLOGY

## 2025-01-02 PROCEDURE — 63600175 PHARM REV CODE 636 W HCPCS: Performed by: OBSTETRICS & GYNECOLOGY

## 2025-01-02 PROCEDURE — 37000008 HC ANESTHESIA 1ST 15 MINUTES: Performed by: OBSTETRICS & GYNECOLOGY

## 2025-01-02 PROCEDURE — 37000009 HC ANESTHESIA EA ADD 15 MINS: Performed by: OBSTETRICS & GYNECOLOGY

## 2025-01-02 PROCEDURE — 36000710: Performed by: OBSTETRICS & GYNECOLOGY

## 2025-01-02 PROCEDURE — 27000221 HC OXYGEN, UP TO 24 HOURS

## 2025-01-02 PROCEDURE — C2628 CATHETER, OCCLUSION: HCPCS | Performed by: OBSTETRICS & GYNECOLOGY

## 2025-01-02 PROCEDURE — 99900035 HC TECH TIME PER 15 MIN (STAT)

## 2025-01-02 PROCEDURE — 63600175 PHARM REV CODE 636 W HCPCS: Performed by: NURSE ANESTHETIST, CERTIFIED REGISTERED

## 2025-01-02 PROCEDURE — 25000003 PHARM REV CODE 250: Performed by: OBSTETRICS & GYNECOLOGY

## 2025-01-02 PROCEDURE — 86900 BLOOD TYPING SEROLOGIC ABO: CPT | Performed by: OBSTETRICS & GYNECOLOGY

## 2025-01-02 PROCEDURE — 58571 TLH W/T/O 250 G OR LESS: CPT | Mod: ,,, | Performed by: OBSTETRICS & GYNECOLOGY

## 2025-01-02 PROCEDURE — 25000003 PHARM REV CODE 250: Performed by: NURSE ANESTHETIST, CERTIFIED REGISTERED

## 2025-01-02 PROCEDURE — 99900031 HC PATIENT EDUCATION (STAT)

## 2025-01-02 PROCEDURE — 25000003 PHARM REV CODE 250: Performed by: ANESTHESIOLOGY

## 2025-01-02 PROCEDURE — 27201423 OPTIME MED/SURG SUP & DEVICES STERILE SUPPLY: Performed by: OBSTETRICS & GYNECOLOGY

## 2025-01-02 PROCEDURE — 63600175 PHARM REV CODE 636 W HCPCS: Performed by: ANESTHESIOLOGY

## 2025-01-02 PROCEDURE — 94761 N-INVAS EAR/PLS OXIMETRY MLT: CPT

## 2025-01-02 PROCEDURE — 36415 COLL VENOUS BLD VENIPUNCTURE: CPT | Performed by: OBSTETRICS & GYNECOLOGY

## 2025-01-02 RX ORDER — KETOROLAC TROMETHAMINE 30 MG/ML
30 INJECTION, SOLUTION INTRAMUSCULAR; INTRAVENOUS
Status: COMPLETED | OUTPATIENT
Start: 2025-01-02 | End: 2025-01-03

## 2025-01-02 RX ORDER — ROCURONIUM BROMIDE 10 MG/ML
INJECTION, SOLUTION INTRAVENOUS
Status: DISCONTINUED | OUTPATIENT
Start: 2025-01-02 | End: 2025-01-02

## 2025-01-02 RX ORDER — POLYETHYLENE GLYCOL 3350 17 G/17G
17 POWDER, FOR SOLUTION ORAL DAILY
Status: DISCONTINUED | OUTPATIENT
Start: 2025-01-02 | End: 2025-01-03 | Stop reason: HOSPADM

## 2025-01-02 RX ORDER — ACETAMINOPHEN 10 MG/ML
INJECTION, SOLUTION INTRAVENOUS
Status: DISCONTINUED | OUTPATIENT
Start: 2025-01-02 | End: 2025-01-02

## 2025-01-02 RX ORDER — FAMOTIDINE 10 MG/ML
INJECTION INTRAVENOUS
Status: DISCONTINUED | OUTPATIENT
Start: 2025-01-02 | End: 2025-01-02

## 2025-01-02 RX ORDER — FAMOTIDINE 20 MG/1
20 TABLET, FILM COATED ORAL
Status: DISCONTINUED | OUTPATIENT
Start: 2025-01-02 | End: 2025-01-02 | Stop reason: HOSPADM

## 2025-01-02 RX ORDER — SODIUM CHLORIDE, SODIUM LACTATE, POTASSIUM CHLORIDE, CALCIUM CHLORIDE 600; 310; 30; 20 MG/100ML; MG/100ML; MG/100ML; MG/100ML
INJECTION, SOLUTION INTRAVENOUS CONTINUOUS
Status: DISCONTINUED | OUTPATIENT
Start: 2025-01-02 | End: 2025-01-03 | Stop reason: HOSPADM

## 2025-01-02 RX ORDER — ONDANSETRON 4 MG/1
8 TABLET, ORALLY DISINTEGRATING ORAL EVERY 8 HOURS PRN
Status: DISCONTINUED | OUTPATIENT
Start: 2025-01-02 | End: 2025-01-03 | Stop reason: HOSPADM

## 2025-01-02 RX ORDER — HYDROCODONE BITARTRATE AND ACETAMINOPHEN 10; 325 MG/1; MG/1
1 TABLET ORAL EVERY 4 HOURS PRN
Status: DISCONTINUED | OUTPATIENT
Start: 2025-01-02 | End: 2025-01-03 | Stop reason: HOSPADM

## 2025-01-02 RX ORDER — HYDROMORPHONE HYDROCHLORIDE 1 MG/ML
1 INJECTION, SOLUTION INTRAMUSCULAR; INTRAVENOUS; SUBCUTANEOUS EVERY 4 HOURS PRN
Status: DISCONTINUED | OUTPATIENT
Start: 2025-01-02 | End: 2025-01-03 | Stop reason: HOSPADM

## 2025-01-02 RX ORDER — CEFAZOLIN 2 G/1
2 INJECTION, POWDER, FOR SOLUTION INTRAMUSCULAR; INTRAVENOUS
Status: COMPLETED | OUTPATIENT
Start: 2025-01-02 | End: 2025-01-02

## 2025-01-02 RX ORDER — IBUPROFEN 400 MG/1
800 TABLET ORAL
Status: DISCONTINUED | OUTPATIENT
Start: 2025-01-03 | End: 2025-01-03 | Stop reason: HOSPADM

## 2025-01-02 RX ORDER — EPHEDRINE SULFATE 50 MG/ML
INJECTION, SOLUTION INTRAVENOUS
Status: DISCONTINUED | OUTPATIENT
Start: 2025-01-02 | End: 2025-01-02

## 2025-01-02 RX ORDER — MUPIROCIN 20 MG/G
OINTMENT TOPICAL
Status: COMPLETED | OUTPATIENT
Start: 2025-01-02 | End: 2025-01-02

## 2025-01-02 RX ORDER — HYDROMORPHONE HYDROCHLORIDE 2 MG/ML
0.5 INJECTION, SOLUTION INTRAMUSCULAR; INTRAVENOUS; SUBCUTANEOUS EVERY 5 MIN PRN
Status: DISCONTINUED | OUTPATIENT
Start: 2025-01-02 | End: 2025-01-02 | Stop reason: HOSPADM

## 2025-01-02 RX ORDER — MEPERIDINE HYDROCHLORIDE 25 MG/ML
12.5 INJECTION INTRAMUSCULAR; INTRAVENOUS; SUBCUTANEOUS ONCE AS NEEDED
Status: DISCONTINUED | OUTPATIENT
Start: 2025-01-02 | End: 2025-01-02 | Stop reason: HOSPADM

## 2025-01-02 RX ORDER — LIDOCAINE HYDROCHLORIDE 10 MG/ML
0.5 INJECTION, SOLUTION EPIDURAL; INFILTRATION; INTRACAUDAL; PERINEURAL
Status: DISCONTINUED | OUTPATIENT
Start: 2025-01-02 | End: 2025-01-02 | Stop reason: HOSPADM

## 2025-01-02 RX ORDER — GLUCAGON 1 MG
1 KIT INJECTION
Status: DISCONTINUED | OUTPATIENT
Start: 2025-01-02 | End: 2025-01-02 | Stop reason: HOSPADM

## 2025-01-02 RX ORDER — DIPHENHYDRAMINE HYDROCHLORIDE 50 MG/ML
25 INJECTION INTRAMUSCULAR; INTRAVENOUS EVERY 6 HOURS PRN
Status: DISCONTINUED | OUTPATIENT
Start: 2025-01-02 | End: 2025-01-02 | Stop reason: HOSPADM

## 2025-01-02 RX ORDER — HYDROCODONE BITARTRATE AND ACETAMINOPHEN 5; 325 MG/1; MG/1
1 TABLET ORAL EVERY 4 HOURS PRN
Status: DISCONTINUED | OUTPATIENT
Start: 2025-01-02 | End: 2025-01-03 | Stop reason: HOSPADM

## 2025-01-02 RX ORDER — ONDANSETRON HYDROCHLORIDE 2 MG/ML
INJECTION, SOLUTION INTRAMUSCULAR; INTRAVENOUS
Status: DISCONTINUED | OUTPATIENT
Start: 2025-01-02 | End: 2025-01-02

## 2025-01-02 RX ORDER — ONDANSETRON HYDROCHLORIDE 2 MG/ML
4 INJECTION, SOLUTION INTRAVENOUS DAILY PRN
Status: DISCONTINUED | OUTPATIENT
Start: 2025-01-02 | End: 2025-01-02 | Stop reason: HOSPADM

## 2025-01-02 RX ORDER — PROCHLORPERAZINE EDISYLATE 5 MG/ML
5 INJECTION INTRAMUSCULAR; INTRAVENOUS EVERY 10 MIN PRN
Status: DISCONTINUED | OUTPATIENT
Start: 2025-01-02 | End: 2025-01-02 | Stop reason: HOSPADM

## 2025-01-02 RX ORDER — METOCLOPRAMIDE HYDROCHLORIDE 5 MG/ML
INJECTION INTRAMUSCULAR; INTRAVENOUS
Status: DISCONTINUED | OUTPATIENT
Start: 2025-01-02 | End: 2025-01-02

## 2025-01-02 RX ORDER — HYDROMORPHONE HYDROCHLORIDE 2 MG/ML
0.2 INJECTION, SOLUTION INTRAMUSCULAR; INTRAVENOUS; SUBCUTANEOUS EVERY 5 MIN PRN
Status: DISCONTINUED | OUTPATIENT
Start: 2025-01-02 | End: 2025-01-02 | Stop reason: HOSPADM

## 2025-01-02 RX ORDER — SCOLOPAMINE TRANSDERMAL SYSTEM 1 MG/1
1 PATCH, EXTENDED RELEASE TRANSDERMAL
Status: DISCONTINUED | OUTPATIENT
Start: 2025-01-02 | End: 2025-01-03 | Stop reason: HOSPADM

## 2025-01-02 RX ORDER — MORPHINE SULFATE 4 MG/ML
4 INJECTION, SOLUTION INTRAMUSCULAR; INTRAVENOUS EVERY 5 MIN PRN
Status: DISCONTINUED | OUTPATIENT
Start: 2025-01-02 | End: 2025-01-02 | Stop reason: HOSPADM

## 2025-01-02 RX ORDER — SODIUM CHLORIDE 0.9 % (FLUSH) 0.9 %
3 SYRINGE (ML) INJECTION
Status: DISCONTINUED | OUTPATIENT
Start: 2025-01-02 | End: 2025-01-02 | Stop reason: HOSPADM

## 2025-01-02 RX ORDER — ACETAMINOPHEN 325 MG/1
650 TABLET ORAL EVERY 4 HOURS PRN
Status: DISCONTINUED | OUTPATIENT
Start: 2025-01-02 | End: 2025-01-03 | Stop reason: HOSPADM

## 2025-01-02 RX ORDER — HYDROCODONE BITARTRATE AND ACETAMINOPHEN 5; 325 MG/1; MG/1
1 TABLET ORAL EVERY 4 HOURS PRN
Status: DISCONTINUED | OUTPATIENT
Start: 2025-01-02 | End: 2025-01-02 | Stop reason: HOSPADM

## 2025-01-02 RX ORDER — DIPHENHYDRAMINE HCL 25 MG
25 CAPSULE ORAL EVERY 4 HOURS PRN
Status: DISCONTINUED | OUTPATIENT
Start: 2025-01-02 | End: 2025-01-03 | Stop reason: HOSPADM

## 2025-01-02 RX ORDER — SODIUM CHLORIDE 9 MG/ML
INJECTION, SOLUTION INTRAVENOUS CONTINUOUS
Status: DISCONTINUED | OUTPATIENT
Start: 2025-01-02 | End: 2025-01-02

## 2025-01-02 RX ORDER — DEXAMETHASONE SODIUM PHOSPHATE 4 MG/ML
INJECTION, SOLUTION INTRA-ARTICULAR; INTRALESIONAL; INTRAMUSCULAR; INTRAVENOUS; SOFT TISSUE
Status: DISCONTINUED | OUTPATIENT
Start: 2025-01-02 | End: 2025-01-02

## 2025-01-02 RX ORDER — AMOXICILLIN 250 MG
1 CAPSULE ORAL 2 TIMES DAILY
Status: DISCONTINUED | OUTPATIENT
Start: 2025-01-02 | End: 2025-01-03 | Stop reason: HOSPADM

## 2025-01-02 RX ORDER — DIPHENHYDRAMINE HYDROCHLORIDE 50 MG/ML
25 INJECTION INTRAMUSCULAR; INTRAVENOUS EVERY 4 HOURS PRN
Status: DISCONTINUED | OUTPATIENT
Start: 2025-01-02 | End: 2025-01-03 | Stop reason: HOSPADM

## 2025-01-02 RX ORDER — MIDAZOLAM HYDROCHLORIDE 1 MG/ML
INJECTION INTRAMUSCULAR; INTRAVENOUS
Status: DISCONTINUED | OUTPATIENT
Start: 2025-01-02 | End: 2025-01-02

## 2025-01-02 RX ORDER — PROPOFOL 10 MG/ML
VIAL (ML) INTRAVENOUS
Status: DISCONTINUED | OUTPATIENT
Start: 2025-01-02 | End: 2025-01-02

## 2025-01-02 RX ORDER — HYDROMORPHONE HYDROCHLORIDE 2 MG/ML
INJECTION, SOLUTION INTRAMUSCULAR; INTRAVENOUS; SUBCUTANEOUS
Status: DISCONTINUED | OUTPATIENT
Start: 2025-01-02 | End: 2025-01-02

## 2025-01-02 RX ORDER — PROCHLORPERAZINE EDISYLATE 5 MG/ML
5 INJECTION INTRAMUSCULAR; INTRAVENOUS EVERY 6 HOURS PRN
Status: DISCONTINUED | OUTPATIENT
Start: 2025-01-02 | End: 2025-01-03 | Stop reason: HOSPADM

## 2025-01-02 RX ORDER — FENTANYL CITRATE 50 UG/ML
INJECTION, SOLUTION INTRAMUSCULAR; INTRAVENOUS
Status: DISCONTINUED | OUTPATIENT
Start: 2025-01-02 | End: 2025-01-02

## 2025-01-02 RX ADMIN — FAMOTIDINE 20 MG: 10 INJECTION, SOLUTION INTRAVENOUS at 09:01

## 2025-01-02 RX ADMIN — ROCURONIUM BROMIDE 60 MG: 10 INJECTION, SOLUTION INTRAVENOUS at 09:01

## 2025-01-02 RX ADMIN — FENTANYL CITRATE 50 MCG: 50 INJECTION INTRAMUSCULAR; INTRAVENOUS at 10:01

## 2025-01-02 RX ADMIN — CEFAZOLIN 2 G: 2 INJECTION, POWDER, FOR SOLUTION INTRAMUSCULAR; INTRAVENOUS at 08:01

## 2025-01-02 RX ADMIN — SENNOSIDES AND DOCUSATE SODIUM 1 TABLET: 50; 8.6 TABLET ORAL at 08:01

## 2025-01-02 RX ADMIN — KETOROLAC TROMETHAMINE 30 MG: 30 INJECTION, SOLUTION INTRAMUSCULAR; INTRAVENOUS at 06:01

## 2025-01-02 RX ADMIN — SCOPOLAMINE 1 PATCH: 1.5 PATCH, EXTENDED RELEASE TRANSDERMAL at 08:01

## 2025-01-02 RX ADMIN — ROCURONIUM BROMIDE 20 MG: 10 INJECTION, SOLUTION INTRAVENOUS at 10:01

## 2025-01-02 RX ADMIN — HYDROMORPHONE HYDROCHLORIDE 1 MG: 1 INJECTION, SOLUTION INTRAMUSCULAR; INTRAVENOUS; SUBCUTANEOUS at 04:01

## 2025-01-02 RX ADMIN — HYDROCODONE BITARTRATE AND ACETAMINOPHEN 1 TABLET: 10; 325 TABLET ORAL at 08:01

## 2025-01-02 RX ADMIN — ONDANSETRON 4 MG: 2 INJECTION INTRAMUSCULAR; INTRAVENOUS at 09:01

## 2025-01-02 RX ADMIN — MORPHINE SULFATE 4 MG: 4 INJECTION INTRAVENOUS at 11:01

## 2025-01-02 RX ADMIN — SODIUM CHLORIDE, POTASSIUM CHLORIDE, SODIUM LACTATE AND CALCIUM CHLORIDE: 600; 310; 30; 20 INJECTION, SOLUTION INTRAVENOUS at 12:01

## 2025-01-02 RX ADMIN — PROCHLORPERAZINE EDISYLATE 5 MG: 5 INJECTION INTRAMUSCULAR; INTRAVENOUS at 12:01

## 2025-01-02 RX ADMIN — EPHEDRINE SULFATE 15 MG: 50 INJECTION, SOLUTION INTRAVENOUS at 10:01

## 2025-01-02 RX ADMIN — FENTANYL CITRATE 100 MCG: 50 INJECTION INTRAMUSCULAR; INTRAVENOUS at 09:01

## 2025-01-02 RX ADMIN — MIDAZOLAM 2 MG: 1 INJECTION INTRAMUSCULAR; INTRAVENOUS at 09:01

## 2025-01-02 RX ADMIN — SODIUM CHLORIDE, POTASSIUM CHLORIDE, SODIUM LACTATE AND CALCIUM CHLORIDE: 600; 310; 30; 20 INJECTION, SOLUTION INTRAVENOUS at 10:01

## 2025-01-02 RX ADMIN — SENNOSIDES AND DOCUSATE SODIUM 1 TABLET: 50; 8.6 TABLET ORAL at 12:01

## 2025-01-02 RX ADMIN — MUPIROCIN: 20 OINTMENT TOPICAL at 08:01

## 2025-01-02 RX ADMIN — DEXAMETHASONE SODIUM PHOSPHATE 4 MG: 4 INJECTION, SOLUTION INTRA-ARTICULAR; INTRALESIONAL; INTRAMUSCULAR; INTRAVENOUS; SOFT TISSUE at 09:01

## 2025-01-02 RX ADMIN — HYDROMORPHONE HYDROCHLORIDE 1 MG: 2 INJECTION, SOLUTION INTRAMUSCULAR; INTRAVENOUS; SUBCUTANEOUS at 09:01

## 2025-01-02 RX ADMIN — POLYETHYLENE GLYCOL (3350) 17 G: 17 POWDER, FOR SOLUTION ORAL at 12:01

## 2025-01-02 RX ADMIN — METOCLOPRAMIDE 10 MG: 5 INJECTION, SOLUTION INTRAMUSCULAR; INTRAVENOUS at 09:01

## 2025-01-02 RX ADMIN — PROPOFOL 200 MG: 10 INJECTION, EMULSION INTRAVENOUS at 09:01

## 2025-01-02 RX ADMIN — EPHEDRINE SULFATE 10 MG: 50 INJECTION, SOLUTION INTRAVENOUS at 09:01

## 2025-01-02 RX ADMIN — SUGAMMADEX 200 MG: 100 INJECTION, SOLUTION INTRAVENOUS at 10:01

## 2025-01-02 RX ADMIN — SODIUM CHLORIDE, POTASSIUM CHLORIDE, SODIUM LACTATE AND CALCIUM CHLORIDE: 600; 310; 30; 20 INJECTION, SOLUTION INTRAVENOUS at 07:01

## 2025-01-02 RX ADMIN — ACETAMINOPHEN 1000 MG: 10 INJECTION, SOLUTION INTRAVENOUS at 09:01

## 2025-01-02 RX ADMIN — HYDROCODONE BITARTRATE AND ACETAMINOPHEN 1 TABLET: 10; 325 TABLET ORAL at 02:01

## 2025-01-02 RX ADMIN — EPHEDRINE SULFATE 25 MG: 50 INJECTION, SOLUTION INTRAVENOUS at 09:01

## 2025-01-02 RX ADMIN — KETOROLAC TROMETHAMINE 30 MG: 30 INJECTION, SOLUTION INTRAMUSCULAR; INTRAVENOUS at 12:01

## 2025-01-02 RX ADMIN — HYDROMORPHONE HYDROCHLORIDE 1 MG: 1 INJECTION, SOLUTION INTRAMUSCULAR; INTRAVENOUS; SUBCUTANEOUS at 12:01

## 2025-01-02 NOTE — OP NOTE
Surgery Date: 1/2/2025     Surgeons and Role:     * Corrine Hernandez MD - Primary    Pre-op Diagnosis:    Irregular menstrual cycle [N92.6]  Breakthrough bleeding associated with intrauterine device (IUD) [N92.1, Z97.5]  IUD (intrauterine device) in place [Z97.5]    Post-op Diagnosis:    Irregular menstrual cycle [N92.6]  Breakthrough bleeding associated with intrauterine device (IUD) [N92.1, Z97.5]  IUD (intrauterine device) in place [Z97.5]    Procedure(s):  Procedure(s):  IUD removal  HYSTERECTOMY,TOTAL,LAPAROSCOPIC,WITH SALPINGECTOMY      Specimens (From admission, onward)       Start     Ordered    01/02/25 1012  Specimen to Pathology, Surgery Gynecology and Obstetrics  Once        Comments: Pre-op Diagnosis: Irregular menstrual cycle [N92.6]Breakthrough bleeding associated with intrauterine device (IUD) [N92.1, Z97.5]IUD (intrauterine device) in place [Z97.5]Procedure(s):HYSTERECTOMY,TOTAL,LAPAROSCOPIC,WITH SALPINGECTOMY Number of specimens: 1Name of specimens: Uterus, cervix,  bilateral fallopian tubes @ 1015     References:    Click here for ordering Quick Tip   Question Answer Comment   Procedure Type: Gynecology and Obstetrics    Release to patient Immediate        01/02/25 1050                     Anesthesia: General    EBL: 30 cc  UOP: 110 cc  IV fluids: 1300 cc    Findings:  Normal-appearing uterus, tubes, and ovaries.    Procedure in Detail:  The patient was placed on the operating table in the dorsal supine position and given satisfactory general endotracheal anesthesia.  She was then placed in the lithotomy position. The abdomen was prepped with Chloroprep and the vagina was prepped with betadine solution. A Pastrana catheter was placed into the bladder for drainage.  She was then draped in the usual manner for laparoscopic procedure.     After assuring adequate anesthesia, a sterile weighted speculum was placed into the vagina and the anterior lip of the cervix grasped with a single-tooth tenaculum.   The IUD strings were visible and they were grasped with a ring forceps and the IUD was removed without difficulty.  The the uterus was sounded to 8 cm and the cervix dilated to allow a WILMAN uterine manipulator and KOH colpotomizer to be placed.  Stay sutures of 0 Vicryl were placed at the 12 and 6 o'clock positions on the cervix.  The manipulator and colpotomizer were assembled and inserted without difficulty.     Attention was turned to the abdomen where a 1 cm infraumbilical skin incision was then made. The  Veress needle was introduced into the abdomen without difficulty. The abdomen was inflated with CO2 to a pressure of 15mm Hg.  The Veress needle was removed and a 10 mm Optiview port was then placed in the umbilicus without difficulty using the laparoscope as guidance for entering the abdomen. Following this 5 mm ports were also placed in the right and left lower quadrants under direct visualization.    The pelvis was inspected. The uterus appeared nonenlarged.  The ovaries appeared normal bilaterally. The pelvic peritoneum appeared Normal. The upper abdomen was inspected and appeared Normal.    The left fallopian tube was grasped and excised using the LigaSure.  The left round ligament was then ligated and divided using the LigaSure.  The left side of the bladder flap was created as the incision was extended medially.  The utero-ovarian ligament was then ligated and divided on the left side.  The uterine vessels were skeletonized on the left side and then ligated and divided using the LigaSure.  Attention was turned to the opposite side where the right fallopian tube was excised using LigaSure.  The right round ligament was then ligated and divided in a similar fashion.  The bladder flap was then completed as the incision was extended medially.  The right utero-ovarian ligament was then ligated and divided with the LigaSure.  The uterine vessels were skeletonized on the right side and were then ligated and  divided using the LigaSure.    Attention was turned posteriorly where a posterior colpotomy incision was made along the colpotomizer using the harmonic scalpel.  This incision was extended laterally to the level of the uterine arteries.  The uterus was retroflexed and the anterior colpotomy incision was made in a similar fashion.  Incision was extended laterally to the level of the uterine arteries.  The uterine artery complex was ligated and divided with the LigaSure once more and the anterior and posterior colpotomy incisions were connected with the harmonic scalpel.  The uterus, cervix, and bilateral tubes were removed vaginally and a laparotomy sponge wrapped in a glove was placed into the vaginal canal to maintain pneumoperitoneum.    The vaginal cuff was reapproximated with a 0 Vicryl V lock suture in a running fashion.  Excellent hemostasis was noted.  The CO2 gas was allowed to escape from the abdomen and the operative field remained hemostatic.  All instruments and trocars were removed from the abdominal cavity.    The skin incisions were closed with 4-0 Monocryl in a subcuticular stitch.  The laparotomy sponge and glove were removed from the vaginal canal.     The patient tolerated the procedure well and left the operating room awake, alert, and with vital signs stable.      Estimated blood loss was approximately 30 cc.

## 2025-01-02 NOTE — PLAN OF CARE
1110 o2 at 6l mask d/c at 1109; sat at 92 to 93% on room air , placed on 2l n/c, sat increased to 95 to 96%.

## 2025-01-02 NOTE — PLAN OF CARE
"Patient prepared for procedure. Mother at the bedside. Patient reports being "nervous about waking up in pain after procedure." Currently reports pain 6/10 to bilateral shoulders and hips. 20g IV inserted to left hand, infusing. Nasal ointment applied without difficulty. Bed locked and in lowest position to the floor. Call bell placed in reach for any needs.  "

## 2025-01-02 NOTE — TRANSFER OF CARE
Anesthesia Transfer of Care Note    Patient: Jonas Aguilar    Procedure(s) Performed: Procedure(s) (LRB):  HYSTERECTOMY,TOTAL,LAPAROSCOPIC,WITH SALPINGECTOMY (Bilateral)    Patient location: PACU    Anesthesia Type: general    Transport from OR: Transported from OR on room air with adequate spontaneous ventilation    Post pain: adequate analgesia    Post assessment: no apparent anesthetic complications    Post vital signs: stable    Level of consciousness: awake, alert and oriented    Nausea/Vomiting: no nausea/vomiting    Complications: none    Transfer of care protocol was followed      Last vitals:     /76  P 97  R 14  T 98  O2 Sat 98%

## 2025-01-02 NOTE — PLAN OF CARE
[facility] Surg  Initial Discharge Assessment       Primary Care Provider: Khalida Wesley NP    Admission Diagnosis: Irregular menstrual cycle [N92.6]  Breakthrough bleeding associated with intrauterine device (IUD) [N92.1, Z97.5]  IUD (intrauterine device) in place [Z97.5]  Irregular menses [N92.6]    Admission Date: 1/2/2025  Expected Discharge Date: 1/3/2025    Transition of Care Barriers: None    Payor: MEDICAID / Plan: [facility] / Product Type: Managed Medicaid /     Extended Emergency Contact Information  Primary Emergency Contact: Jeannie Iglesias  Address: [address] [place] [address] [id number] [place] [address]  Mobile Phone: [phone]  Relation: Mother    Discharge Plan A: Home with family, Home  Discharge Plan B: Home, Home with family      [facility] - [place], [place] - [address] [facility] [facility]  [address] [facility] [address]  Phone: [phone] Fax: [phone]    [id number] - [place], [place] - [address]  [address]  [facility] [address]  Phone: [phone] Fax: [phone]      Initial Assessment (most recent)       Adult Discharge Assessment - 01/02/25 1421          Discharge Assessment    Assessment Type Discharge Planning Assessment     Confirmed/corrected address, phone number and insurance Yes     Confirmed Demographics Correct on Facesheet     Source of Information patient;family     When was your last doctors appointment? 12/23/24     Reason For Admission Irregular menses     People in Home parent(s)     Do you expect to return to your current living situation? Yes     Do you have help at home or someone to help you manage your care at home? Yes     Who are your caregiver(s) and their phone number(s)? Jeannie (Mother)  [phone] (Mobile)     Prior to hospitilization cognitive status: Alert/Oriented     Current cognitive status: Alert/Oriented   The patient was in-and-out of sleep during the assessment, but  he was able to answer the questions that were asked.    Walking or Climbing Stairs Difficulty yes     Walking or Climbing Stairs ambulation difficulty, requires equipment;ambulation difficulty, assistance 1 person;stair climbing difficulty, assistance 1 person;transferring difficulty, assistance 1 person     Mobility Management cane     Dressing/Bathing Difficulty yes     Dressing/Bathing bathing difficulty, requires equipment     Dressing/Bathing Management shower chair     Do you have any problems with: Needs other help;Errands/Grocery   The patient reports that he has a significant other who assist him with his care as well.    Home Accessibility stairs to enter home;stairs within home     Number of Stairs, Within Home, Primary none     Equipment Currently Used at Home cane, straight;shower chair     Readmission within 30 days? No     Do you currently have service(s) that help you manage your care at home? No     Do you take prescription medications? Yes     Do you have prescription coverage? Yes     Coverage MEDICAID - [facility]     Do you have any problems affording any of your prescribed medications? TBD     Is the patient taking medications as prescribed? yes     Who is going to help you get home at discharge? Jeannie (Mother)  [phone] (Mobile)     How do you get to doctors appointments? car, drives self;family or friend will provide     Discharge Plan A Home with family;Home     Discharge Plan B Home;Home with family     Discharge Plan discussed with: Patient;Parent(s)     Name(s) and Number(s) Jeannie (Mother)  [phone] (Mobile)     Transition of Care Barriers None        Financial Resource Strain    How hard is it for you to pay for the very basics like food, housing, medical care, and heating? Somewhat hard        Housing Stability    In the last 12 months, was there a time when you were not able to pay the mortgage or rent on time? No     At any time in the past 12 months,  were you homeless or living in a shelter (including now)? No        Transportation Needs    Has the lack of transportation kept you from medical appointments, meetings, work or from getting things needed for daily living? No        Food Insecurity    Within the past 12 months, you worried that your food would run out before you got the money to buy more. Never true     Within the past 12 months, the food you bought just didn't last and you didn't have money to get more. Never true        Alcohol Use    Q1: How often do you have a drink containing alcohol? Monthly or less     Q2: How many drinks containing alcohol do you have on a typical day when you are drinking? 1 or 2     Q3: How often do you have six or more drinks on one occasion? Never        Utilities    In the past 12 months has the electric, gas, oil, or water company threatened to shut off services in your home? No        Health Literacy    How often do you need to have someone help you when you read instructions, pamphlets, or other written material from your doctor or pharmacy? Never                 Discharge assessment completed with the patient and his mother, Jeannie. The patient is from home. He plans to return home with his mother upon discharge. The patient expressed that he does require assistance with his care. He reports that he has a significant other who helps him with his ADLs. The patient stated that he also uses a cane to help him to ambulate.       The patient does present with several social determinants of health. I was able to provide the patient and his mother with the contact information to [facility] and the Waiver Program with the [facility]. Discharge planning checklist given to the patient/family with instructions to contact  for any needs.  SW will follow as needed.

## 2025-01-02 NOTE — ANESTHESIA PROCEDURE NOTES
Intubation    Date/Time: 1/2/2025 9:13 AM    Performed by: Alan Graham CRNA  Authorized by: Alan Graham CRNA    Intubation:     Induction:  Intravenous    Intubated:  Postinduction    Mask Ventilation:  Easy with oral airway    Attempts:  1    Attempted By:  CRNA    Method of Intubation:  Direct    Blade:  Valle 2    Laryngeal View Grade: Grade I - full view of cords      Difficult Airway Encountered?: No      Complications:  None    Airway Device:  Oral endotracheal tube    Airway Device Size:  7.0    Style/Cuff Inflation:  Cuffed (inflated to minimal occlusive pressure)    Tube secured:  21    Secured at:  The lips    Placement Verified By:  Capnometry    Complicating Factors:  None    Findings Post-Intubation:  BS equal bilateral and atraumatic/condition of teeth unchanged

## 2025-01-02 NOTE — H&P
Subjective:    Patient ID: Jonas Aguilar is a 30 y.o. y.o. female.     Chief Complaint:   Chief Complaint   Patient presents with    Dysfunctional Uterine Bleeding       History of Present Illness:  Jonas presents today for  [facility] with bilateral salpingectomy  for irregular uterine bleeding.    He has been on testosterone therapy since age 17 in order to transition to male gender.  During the process he was placed on Depo-Provera to help control cycles.  Developed bleeding almost daily with the Depo-Provera.  Back in June a Mirena IUD was placed in order to help with bleeding.  It has helped some but is still not an ideal situation.  He would like permanent/definitive management in the form of hysterectomy.     He had begun this process in [place] and was scheduled for hysterectomy but moved back home before he could have the surgery.  He recently had workup from GI due to some abdominal pain and he has ultimately been diagnosed with IBS-C.     Wants to keep ovaries at this time.      Menstrual History:   No LMP recorded. (Menstrual status: Other)..     OB History    No obstetric history on file.           Review of Systems   Constitutional:  Negative for chills and fever.   Respiratory:  Negative for shortness of breath.    Cardiovascular:  Negative for chest pain.   Gastrointestinal:  Positive for constipation. Negative for diarrhea.   Genitourinary:  Positive for menstrual problem. Negative for pelvic pain.   Neurological:  Negative for headaches.   Psychiatric/Behavioral:  Positive for depression.          Objective:    Vital Signs:  Vitals:    01/02/25 0720   BP: 121/71   Pulse: 83   Resp: 20   Temp: 98.9 °F (37.2 °C)       Physical Exam:  General:  alert, no distress, walks with cane   Skin:  Skin color, texture, turgor normal. No rashes or lesions   HEENT:  conjunctivae/corneas clear. PERRL.   Neck: supple, trachea midline, no adenopathy or thyromegaly   Respiratory:  clear to auscultation  bilaterally   Heart:  regular rate and rhythm, S1, S2 normal, no murmur, click, rub or gallop   Abdomen:  Soft, non-tender. Bowel sounds normal. No masses,  no organomegaly   Pelvis: External genitalia: normal general appearance  Urinary system: urethral meatus normal, bladder nontender  Vaginal: normal mucosa without prolapse or lesions  Cervix: normal appearance, IUD string visualized  Uterus: normal single, nontender  Adnexa: normal bimanual exam   Extremities: no edema, no cyanosis, walks with cane   Neurological: Normal strength and tone. No focal numbness or weakness. Reflexes 2+ and equal.   Psychiatric: normal mood, speech, dress, and thought processes             Assessment:      1. Irregular menstrual cycle    2. Breakthrough bleeding associated with intrauterine device (IUD)    3. Female-to-male transgender person    4. Anxiety          Plan:      Irregular menstrual cycle  -     Full code; Standing  -     Vital signs; Standing  -     Pastrana to Gravity; Standing  -     Insert peripheral IV; Standing  -     POCT glucose; Standing  -     Notify physician if BS > 180 for hysterectomy patients; Standing  -     POCT urine pregnancy; Standing  -     Notify Physician/Vital Signs Parameters Urine output less than 0.5mL/kg/hr (with indwelling catheter) or 30 mL/hr (without indwelling catheter) or blood glucose greater than 200 mg/dL; Standing  -     Notify physician; Standing  -     Diet NPO Except for: Medication; Standing  -     Place sequential compression device; Standing  -     Place in Outpatient; Standing    Breakthrough bleeding associated with intrauterine device (IUD)  -     Full code; Standing  -     Vital signs; Standing  -     Pastrana to Gravity; Standing  -     Insert peripheral IV; Standing  -     POCT glucose; Standing  -     Notify physician if BS > 180 for hysterectomy patients; Standing  -     POCT urine pregnancy; Standing  -     Notify Physician/Vital Signs Parameters Urine output less than  0.5mL/kg/hr (with indwelling catheter) or 30 mL/hr (without indwelling catheter) or blood glucose greater than 200 mg/dL; Standing  -     Notify physician; Standing  -     Diet NPO Except for: Medication; Standing  -     Place sequential compression device; Standing  -     Place in Outpatient; Standing    Female-to-male transgender person  -     Full code; Standing  -     Vital signs; Standing  -     Pastrana to Gravity; Standing  -     Insert peripheral IV; Standing  -     POCT glucose; Standing  -     Notify physician if BS > 180 for hysterectomy patients; Standing  -     POCT urine pregnancy; Standing  -     Notify Physician/Vital Signs Parameters Urine output less than 0.5mL/kg/hr (with indwelling catheter) or 30 mL/hr (without indwelling catheter) or blood glucose greater than 200 mg/dL; Standing  -     Notify physician; Standing  -     Diet NPO Except for: Medication; Standing  -     Place sequential compression device; Standing  -     Place in Outpatient; Standing    Anxiety  -     Full code; Standing  -     Vital signs; Standing  -     Pastrana to Gravity; Standing  -     Insert peripheral IV; Standing  -     POCT glucose; Standing  -     Notify physician if BS > 180 for hysterectomy patients; Standing  -     POCT urine pregnancy; Standing  -     Notify Physician/Vital Signs Parameters Urine output less than 0.5mL/kg/hr (with indwelling catheter) or 30 mL/hr (without indwelling catheter) or blood glucose greater than 200 mg/dL; Standing  -     Notify physician; Standing  -     Diet NPO Except for: Medication; Standing  -     Place sequential compression device; Standing  -     Place in Outpatient; Standing    Other orders  -     scopolamine 1.3-1.5 mg (1 mg over 3 days) 1 patch  -     Pregnancy, urine rapid; Standing  -     Cancel: Type & Screen; Standing  -     Cancel: Type & Screen; Standing  -     Type & Screen; Standing  -     Progressive Mobility Protocol (mobilize patient to their highest level of  functioning at least twice daily); Standing  -     LIDOcaine (PF) 10 mg/ml (1%) injection 5 mg  -     lactated ringers infusion  -     mupirocin 2 % ointment  -     ceFAZolin 2 g  -     famotidine tablet 20 mg  -     Type & Screen; Standing  -     Cancel: Type & Screen; Standing  -     Cancel: Type & Screen; Standing  -     Cancel: Type And Screen Preop; Standing  -     Cancel: Type & Screen; Standing  -     Cancel: Type & Screen; Standing          Corrine Hernandez MD FACOG    01/02/2025  8:55 AM

## 2025-01-03 VITALS
HEIGHT: 61 IN | RESPIRATION RATE: 20 BRPM | WEIGHT: 175 LBS | DIASTOLIC BLOOD PRESSURE: 69 MMHG | OXYGEN SATURATION: 94 % | HEART RATE: 97 BPM | SYSTOLIC BLOOD PRESSURE: 118 MMHG | TEMPERATURE: 98 F | BODY MASS INDEX: 33.04 KG/M2

## 2025-01-03 LAB
ANION GAP SERPL CALC-SCNC: 5 MMOL/L (ref 3–11)
BASOPHILS # BLD AUTO: 0.04 K/UL (ref 0–0.2)
BASOPHILS NFR BLD: 0.3 % (ref 0–1.9)
BUN SERPL-MCNC: 13 MG/DL (ref 6–20)
CALCIUM SERPL-MCNC: 9 MG/DL (ref 8.7–10.5)
CHLORIDE SERPL-SCNC: 102 MMOL/L (ref 95–110)
CO2 SERPL-SCNC: 29 MMOL/L (ref 23–29)
CREAT SERPL-MCNC: 1.1 MG/DL (ref 0.5–1.4)
DIFFERENTIAL METHOD BLD: ABNORMAL
EOSINOPHIL # BLD AUTO: 0 K/UL (ref 0–0.5)
EOSINOPHIL NFR BLD: 0.1 % (ref 0–8)
ERYTHROCYTE [DISTWIDTH] IN BLOOD BY AUTOMATED COUNT: 12.5 % (ref 11.5–14.5)
EST. GFR  (NO RACE VARIABLE): >60 ML/MIN/1.73 M^2
GLUCOSE SERPL-MCNC: 114 MG/DL (ref 70–110)
HCT VFR BLD AUTO: 43.8 % (ref 37–48.5)
HGB BLD-MCNC: 15.3 G/DL (ref 12–16)
IMM GRANULOCYTES # BLD AUTO: 0.05 K/UL (ref 0–0.04)
IMM GRANULOCYTES NFR BLD AUTO: 0.3 % (ref 0–0.5)
LYMPHOCYTES # BLD AUTO: 2.2 K/UL (ref 1–4.8)
LYMPHOCYTES NFR BLD: 14.3 % (ref 18–48)
MCH RBC QN AUTO: 29.7 PG (ref 27–31)
MCHC RBC AUTO-ENTMCNC: 34.9 G/DL (ref 32–36)
MCV RBC AUTO: 85 FL (ref 82–98)
MONOCYTES # BLD AUTO: 1.8 K/UL (ref 0.3–1)
MONOCYTES NFR BLD: 11.9 % (ref 4–15)
NEUTROPHILS # BLD AUTO: 11 K/UL (ref 1.8–7.7)
NEUTROPHILS NFR BLD: 73.1 % (ref 38–73)
NRBC BLD-RTO: 0 /100 WBC
PLATELET # BLD AUTO: 296 K/UL (ref 150–450)
PMV BLD AUTO: 10.3 FL (ref 9.2–12.9)
POTASSIUM SERPL-SCNC: 3.8 MMOL/L (ref 3.5–5.1)
RBC # BLD AUTO: 5.15 M/UL (ref 4–5.4)
SODIUM SERPL-SCNC: 136 MMOL/L (ref 136–145)
WBC # BLD AUTO: 15.11 K/UL (ref 3.9–12.7)

## 2025-01-03 PROCEDURE — 63600175 PHARM REV CODE 636 W HCPCS: Performed by: OBSTETRICS & GYNECOLOGY

## 2025-01-03 PROCEDURE — 99900035 HC TECH TIME PER 15 MIN (STAT)

## 2025-01-03 PROCEDURE — 36415 COLL VENOUS BLD VENIPUNCTURE: CPT | Performed by: OBSTETRICS & GYNECOLOGY

## 2025-01-03 PROCEDURE — 85025 COMPLETE CBC W/AUTO DIFF WBC: CPT | Performed by: OBSTETRICS & GYNECOLOGY

## 2025-01-03 PROCEDURE — 25000003 PHARM REV CODE 250: Performed by: OBSTETRICS & GYNECOLOGY

## 2025-01-03 PROCEDURE — 94761 N-INVAS EAR/PLS OXIMETRY MLT: CPT

## 2025-01-03 PROCEDURE — 80048 BASIC METABOLIC PNL TOTAL CA: CPT | Performed by: OBSTETRICS & GYNECOLOGY

## 2025-01-03 RX ORDER — DOCUSATE SODIUM 100 MG/1
100 CAPSULE, LIQUID FILLED ORAL 2 TIMES DAILY
Qty: 60 CAPSULE | Refills: 1 | Status: SHIPPED | OUTPATIENT
Start: 2025-01-03

## 2025-01-03 RX ORDER — HYDROCODONE BITARTRATE AND ACETAMINOPHEN 5; 325 MG/1; MG/1
1 TABLET ORAL EVERY 6 HOURS PRN
Qty: 20 TABLET | Refills: 0 | Status: SHIPPED | OUTPATIENT
Start: 2025-01-03 | End: 2025-01-15

## 2025-01-03 RX ORDER — IBUPROFEN 600 MG/1
600 TABLET ORAL EVERY 8 HOURS PRN
Qty: 30 TABLET | Refills: 1 | Status: SHIPPED | OUTPATIENT
Start: 2025-01-03 | End: 2025-01-15

## 2025-01-03 RX ADMIN — KETOROLAC TROMETHAMINE 30 MG: 30 INJECTION, SOLUTION INTRAMUSCULAR; INTRAVENOUS at 05:01

## 2025-01-03 RX ADMIN — HYDROCODONE BITARTRATE AND ACETAMINOPHEN 1 TABLET: 10; 325 TABLET ORAL at 03:01

## 2025-01-03 RX ADMIN — SENNOSIDES AND DOCUSATE SODIUM 1 TABLET: 50; 8.6 TABLET ORAL at 08:01

## 2025-01-03 RX ADMIN — KETOROLAC TROMETHAMINE 30 MG: 30 INJECTION, SOLUTION INTRAMUSCULAR; INTRAVENOUS at 12:01

## 2025-01-03 NOTE — PLAN OF CARE
[facility]  Discharge Final Note    Primary Care Provider: Khalida Wesley NP    Expected Discharge Date: 1/3/2025    Final Discharge Note (most recent)       Final Note - 01/03/25 0943          Final Note    Assessment Type Final Discharge Note     Anticipated Discharge Disposition Home or Self Care     Hospital Resources/Appts/Education Provided Appointments scheduled and added to AVS;Community resources provided        Post-Acute Status    Discharge Delays None known at this time                     Important Message from Medicare             Contact Info       Corrine Hernandez MD   Specialty: Obstetrics and Gynecology   Relationship: Consulting Physician    [address] [place] [address]   Phone: [phone]       Next Steps: Follow up in 2 week(s)    Instructions: follow up        Final note is completed. The patient will be discharging home with self-care.

## 2025-01-03 NOTE — DISCHARGE SUMMARY
Discharge Summary:  1/3/2025    Admit Date: 1/2/2025    Discharge Date:     Attending Physician: Benton Lucia M.D., FACOG     Reason for Admission: Irregular menses    Patient Active Problem List    Diagnosis Date Noted    IUD (intrauterine device) in place 01/02/2025    Breakthrough bleeding associated with intrauterine device (IUD) 01/02/2025    Irregular menses 01/02/2025    Other irritable bowel syndrome 10/24/2024    Hypokalemia 05/31/2024    Depression     Papilledema     Asthma     Sciatica     Back pain with sciatica 09/15/2022    Attention deficit hyperactivity disorder 06/10/2022    Obesity 06/10/2022    Chronic pain 06/10/2022    Constipation 06/10/2022    Gastroesophageal reflux disease 06/10/2022    Mild intermittent asthma 06/10/2022    Severe recurrent major depression without psychotic features 06/10/2022    Hypercholesteremia 05/11/2022    Female-to-male transgender person 09/22/2021    Hormone replacement therapy 09/22/2021    Transsexualism 12/19/2017    Anxiety 04/25/2016    Vitamin D deficiency disease 06/30/2014    Overweight 03/24/2014    Gender dysphoria        Hospital Course: Hospital Day: 2    1 Day Post-OpProcedure(s) (LRB):  HYSTERECTOMY,TOTAL,LAPAROSCOPIC,WITH SALPINGECTOMY (Bilateral)     Feeling well. Ambulating without problems. Tolerating regular diet. Voiding without difficulty. Vital signs stable. He has been afebrile postoperatively. Lochia absent.       Physical Exam:   Chest: Clear to auscultation   Cardiovascular: Regular rate and Rhythm. No tachycardia   Abd: soft, bowel sounds active, non-tender    Incision: Healing well. No erythema, significant drainage. Sutures intact.   Ext: no cyanosis, clubbing, edema     Labs reviewed and wnl     Procedures Performed:  Procedure(s) (LRB):  HYSTERECTOMY,TOTAL,LAPAROSCOPIC,WITH SALPINGECTOMY (Bilateral)     Discharge Diagnosis:   1. Irregular menses    2. Irregular menstrual cycle    3. Breakthrough bleeding associated with  "intrauterine device (IUD)    4. Female-to-male transgender person    5. Anxiety    6. Post-op pain        Condition on Discharge: stable    Discharge Activity: no driving for 2 weeks, no sex for 6 weeks, no driving while on analgesics, and no heavy lifting for 6 weeks    Patient Instructions:  Current Discharge Medication List        START taking these medications    Details   docusate sodium (COLACE) 100 MG capsule Take 1 capsule (100 mg total) by mouth 2 (two) times daily.  Qty: 60 capsule, Refills: 1      HYDROcodone-acetaminophen (NORCO) 5-325 mg per tablet Take 1 tablet by mouth every 6 (six) hours as needed for Pain.  Qty: 20 tablet, Refills: 0    Comments: Quantity prescribed more than 7 day supply? No; Dx: G89.18  Associated Diagnoses: Post-op pain      ibuprofen (ADVIL,MOTRIN) 600 MG tablet Take 1 tablet (600 mg total) by mouth every 8 (eight) hours as needed for Pain.  Qty: 30 tablet, Refills: 1           CONTINUE these medications which have NOT CHANGED    Details   albuterol (PROVENTIL/VENTOLIN HFA) 90 mcg/actuation inhaler 2 puffs every 6 (six) hours as needed.      atomoxetine (STRATTERA) 80 MG capsule Take 80 mg by mouth every morning.      cholecalciferol, vitamin D3, (VITAMIN D3) 2,000 unit Cap Take 1 capsule by mouth once daily.      clindamycin (CLEOCIN T) 1 % lotion Apply topically.      DULoxetine 40 mg CpDR Take 2 capsules by mouth.      ergocalciferol (VITAMIN D2) 50,000 unit Cap Take 1 capsule (50,000 Units total) by mouth every 7 days.  Qty: 4 capsule, Refills: 1      finasteride (PROSCAR) 5 mg tablet Take 5 mg by mouth once daily.      gabapentin (NEURONTIN) 300 MG capsule Take 300 mg by mouth 3 (three) times daily.      minoxidiL (LONITEN) 2.5 MG tablet Take 2.5 mg by mouth every evening.      BD LUER-GENARO SYRINGE 1 mL Syrg use as directed      needle, disp, 18 G 18 gauge x 1" Ndle BD Regular Bevel Needles 18 gauge x 1"   USE 1 EACH EVERY 7 DAYS TO DRAW UP TESTOTERONE      syringe with " "needle 3 mL 21 gauge x 1 1/2" Syrg BD Luer-Edmar Syringe 3 mL 21 gauge x 1 1/2"   USE 1 SYRINGE FOR TESTOSTERONE INJ EVERY 7 DAYS.(SYRINGE SIZE DIFFERENT/MD)      testosterone cypionate (DEPOTESTOTERONE CYPIONATE) 200 mg/mL injection SMARTSI.4 Milliliter(s) IM Once a Week      UNKNOWN TO PATIENT PKG 3.5G VERONICA DANG             No discharge procedures on file.     Follow-up Information       Corrine Hernandez MD Follow up in 2 week(s).    Specialty: Obstetrics and Gynecology  Why: follow up  Contact information:  [address] [place] [id number]  [phone]                               Corrine Hernandez MD FACOG    2025  7:52 AM   "

## 2025-01-03 NOTE — ANESTHESIA POSTPROCEDURE EVALUATION
Anesthesia Post Evaluation    Patient: Jonas Aguilar    Procedure(s) Performed: Procedure(s) (LRB):  HYSTERECTOMY,TOTAL,LAPAROSCOPIC,WITH SALPINGECTOMY (Bilateral)    Final Anesthesia Type: general      Patient location during evaluation: PACU  Patient participation: Yes- Able to Participate  Level of consciousness: awake and alert and oriented  Post-procedure vital signs: reviewed and stable  Pain management: adequate  Airway patency: patent    PONV status at discharge: No PONV  Anesthetic complications: no      Cardiovascular status: blood pressure returned to baseline  Respiratory status: spontaneous ventilation, unassisted and room air  Hydration status: euvolemic  Follow-up not needed.              Vitals Value Taken Time   BP 86/54 01/03/25 0707   Temp 36.7 °C (98.1 °F) 01/03/25 0707   Pulse 108 01/03/25 0707   Resp 20 01/03/25 0707   SpO2 94 % 01/03/25 0707         Event Time   Out of Recovery 11:41:00         Pain/Lance Score: Pain Rating Prior to Med Admin: 6 (1/3/2025  5:11 AM)  Pain Rating Post Med Admin: 0 (1/3/2025  5:41 AM)  Lance Score: 9 (1/2/2025 11:37 AM)

## 2025-01-03 NOTE — PLAN OF CARE
Problem: Adult Inpatient Plan of Care  Goal: Plan of Care Review  Outcome: Met  Goal: Patient-Specific Goal (Individualized)  Outcome: Met  Goal: Absence of Hospital-Acquired Illness or Injury  Outcome: Met  Goal: Optimal Comfort and Wellbeing  Outcome: Met  Goal: Readiness for Transition of Care  Outcome: Met     Problem: Infection  Goal: Absence of Infection Signs and Symptoms  Outcome: Met     Problem: Wound  Goal: Optimal Coping  Outcome: Met  Goal: Optimal Functional Ability  Outcome: Met  Goal: Absence of Infection Signs and Symptoms  Outcome: Met  Goal: Improved Oral Intake  Outcome: Met  Goal: Optimal Pain Control and Function  Outcome: Met  Goal: Skin Health and Integrity  Outcome: Met  Goal: Optimal Wound Healing  Outcome: Met     Problem: Hysterectomy Total or Partial  Goal: Absence of Bleeding  Outcome: Met  Goal: Effective Bowel Elimination  Outcome: Met  Goal: Fluid and Electrolyte Balance  Outcome: Met  Goal: Absence of Infection Signs and Symptoms  Outcome: Met  Goal: Anesthesia/Sedation Recovery  Outcome: Met  Goal: Acceptable Pain Control  Outcome: Met  Goal: Nausea and Vomiting Relief  Outcome: Met  Goal: Effective Urinary Elimination  Outcome: Met  Goal: Effective Oxygenation and Ventilation  Outcome: Met     Problem: Fall Injury Risk  Goal: Absence of Fall and Fall-Related Injury  Outcome: Met

## 2025-01-03 NOTE — PLAN OF CARE
Plan of care reviewed and ongoing with patient and his mother at the bedside. 20 gauge IV to L hand infusing LR @ mL/hr, room air tolerating well, ambulated to BR with walker at bedside. PRN medication for pain offered relief during the night. Abd dressing clean, dry, and intact. Call light and personal items within reach.       Problem: Adult Inpatient Plan of Care  Goal: Plan of Care Review  Outcome: Progressing  Goal: Patient-Specific Goal (Individualized)  Outcome: Progressing  Goal: Absence of Hospital-Acquired Illness or Injury  Outcome: Progressing  Goal: Optimal Comfort and Wellbeing  Outcome: Progressing  Goal: Readiness for Transition of Care  Outcome: Progressing     Problem: Infection  Goal: Absence of Infection Signs and Symptoms  Outcome: Progressing     Problem: Wound  Goal: Optimal Coping  Outcome: Progressing  Goal: Optimal Functional Ability  Outcome: Progressing  Goal: Absence of Infection Signs and Symptoms  Outcome: Progressing  Goal: Improved Oral Intake  Outcome: Progressing  Goal: Optimal Pain Control and Function  Outcome: Progressing  Goal: Skin Health and Integrity  Outcome: Progressing  Goal: Optimal Wound Healing  Outcome: Progressing     Problem: Hysterectomy Total or Partial  Goal: Absence of Bleeding  Outcome: Progressing  Goal: Effective Bowel Elimination  Outcome: Progressing  Goal: Fluid and Electrolyte Balance  Outcome: Progressing  Goal: Absence of Infection Signs and Symptoms  Outcome: Progressing  Goal: Anesthesia/Sedation Recovery  Outcome: Progressing  Goal: Acceptable Pain Control  Outcome: Progressing  Goal: Nausea and Vomiting Relief  Outcome: Progressing  Goal: Effective Urinary Elimination  Outcome: Progressing  Goal: Effective Oxygenation and Ventilation  Outcome: Progressing     Problem: Fall Injury Risk  Goal: Absence of Fall and Fall-Related Injury  Outcome: Progressing

## 2025-01-03 NOTE — DISCHARGE INSTRUCTIONS
Discharge Activity: no driving for 2 weeks, no sex for 6 weeks, no driving while on analgesics, and no heavy lifting for 6 weeks

## 2025-01-15 ENCOUNTER — OFFICE VISIT (OUTPATIENT)
Dept: OBSTETRICS AND GYNECOLOGY | Facility: CLINIC | Age: 30
End: 2025-01-15
Payer: MEDICAID

## 2025-01-15 VITALS
SYSTOLIC BLOOD PRESSURE: 114 MMHG | DIASTOLIC BLOOD PRESSURE: 74 MMHG | WEIGHT: 175 LBS | BODY MASS INDEX: 33.04 KG/M2 | HEART RATE: 97 BPM | HEIGHT: 61 IN

## 2025-01-15 DIAGNOSIS — Z78.9 FEMALE-TO-MALE TRANSGENDER PERSON: ICD-10-CM

## 2025-01-15 DIAGNOSIS — F32.A DEPRESSION, UNSPECIFIED DEPRESSION TYPE: ICD-10-CM

## 2025-01-15 DIAGNOSIS — F41.9 ANXIETY: ICD-10-CM

## 2025-01-15 DIAGNOSIS — Z90.710 S/P HYSTERECTOMY: Primary | ICD-10-CM

## 2025-01-15 LAB — SPECIMEN TO PATHOLOGY - SURGICAL: NORMAL

## 2025-01-15 PROCEDURE — 3078F DIAST BP <80 MM HG: CPT | Mod: CPTII,,, | Performed by: OBSTETRICS & GYNECOLOGY

## 2025-01-15 PROCEDURE — 1159F MED LIST DOCD IN RCRD: CPT | Mod: CPTII,,, | Performed by: OBSTETRICS & GYNECOLOGY

## 2025-01-15 PROCEDURE — 1160F RVW MEDS BY RX/DR IN RCRD: CPT | Mod: CPTII,,, | Performed by: OBSTETRICS & GYNECOLOGY

## 2025-01-15 PROCEDURE — 3074F SYST BP LT 130 MM HG: CPT | Mod: CPTII,,, | Performed by: OBSTETRICS & GYNECOLOGY

## 2025-01-15 PROCEDURE — 99024 POSTOP FOLLOW-UP VISIT: CPT | Mod: ,,, | Performed by: OBSTETRICS & GYNECOLOGY

## 2025-01-15 PROCEDURE — 99213 OFFICE O/P EST LOW 20 MIN: CPT | Mod: PBBFAC | Performed by: OBSTETRICS & GYNECOLOGY

## 2025-01-15 PROCEDURE — 99999 PR PBB SHADOW E&M-EST. PATIENT-LVL III: CPT | Mod: PBBFAC,,, | Performed by: OBSTETRICS & GYNECOLOGY

## 2025-01-15 NOTE — PROGRESS NOTES
Subjective:    Patient ID: Jonas Aguilar is a 30 y.o. y.o. female    Chief Complaint:   Chief Complaint   Patient presents with    Post-op Evaluation     2 week post op       History of Present Illness:  Jonas presents today for follow up of [facility] 2 weeks ago.  Patient states that he is feeling well and has not had any bleeding or discharge.    He does admit having intercourse since surgery.      Review of Systems   Constitutional:  Negative for chills and fever.   Respiratory:  Negative for shortness of breath.    Cardiovascular:  Negative for chest pain.   Gastrointestinal:  Negative for constipation.   Genitourinary:  Negative for dysuria, pelvic pain and vaginal bleeding.   Neurological:  Negative for headaches.         Objective:    Vital Signs:  Vitals:    01/15/25 0918   BP: 114/74   Pulse: 97     Wt Readings from Last 1 Encounters:   01/15/25 79.4 kg (175 lb)     Body mass index is 33.07 kg/m².    Physical Exam:  General:  alert, no distress   Skin:  Skin color, texture, turgor normal. No rashes or lesions   Abdomen:  Soft, nontender   Extremities: No cyanosis, clubbing, edema       Reiterated importance of pelvic rest during the postoperative period.  Stressed that nothing should enter the vaginal canal at this time and for the next 4 weeks.  All questions answered and precautions given          Assessment:      1. S/P hysterectomy    2. Female-to-male transgender person    3. Anxiety    4. Depression, unspecified depression type          Plan:      S/P hysterectomy    Female-to-male transgender person    Anxiety    Depression, unspecified depression type    RTC 4 weeks for follow up       Corrine Hernandez MD, FACOG   01/15/2025 9:39 AM

## 2025-01-16 NOTE — PROGRESS NOTES
Please call patient regarding results.  Severe cervical dysplasia seen on pathology report.  Patient will still need Paps periodically.  We will discuss further with him at his 6 week postop visit

## 2025-01-28 ENCOUNTER — PATIENT MESSAGE (OUTPATIENT)
Dept: OBSTETRICS AND GYNECOLOGY | Facility: CLINIC | Age: 30
End: 2025-01-28
Payer: MEDICAID

## 2025-02-11 ENCOUNTER — OFFICE VISIT (OUTPATIENT)
Dept: OBSTETRICS AND GYNECOLOGY | Facility: CLINIC | Age: 30
End: 2025-02-11
Payer: MEDICAID

## 2025-02-11 VITALS
HEIGHT: 61 IN | WEIGHT: 175 LBS | HEART RATE: 108 BPM | SYSTOLIC BLOOD PRESSURE: 132 MMHG | BODY MASS INDEX: 33.04 KG/M2 | DIASTOLIC BLOOD PRESSURE: 81 MMHG

## 2025-02-11 DIAGNOSIS — Z90.710 S/P HYSTERECTOMY: Primary | ICD-10-CM

## 2025-02-11 DIAGNOSIS — Z78.9 FEMALE-TO-MALE TRANSGENDER PERSON: ICD-10-CM

## 2025-02-11 DIAGNOSIS — F32.A DEPRESSION, UNSPECIFIED DEPRESSION TYPE: ICD-10-CM

## 2025-02-11 DIAGNOSIS — F41.9 ANXIETY: ICD-10-CM

## 2025-02-11 DIAGNOSIS — D06.9 SEVERE DYSPLASIA OF CERVIX: ICD-10-CM

## 2025-02-11 DIAGNOSIS — Z09 POSTOP CHECK: ICD-10-CM

## 2025-02-11 PROCEDURE — 99999 PR PBB SHADOW E&M-EST. PATIENT-LVL III: CPT | Mod: PBBFAC,,, | Performed by: OBSTETRICS & GYNECOLOGY

## 2025-02-11 PROCEDURE — 99024 POSTOP FOLLOW-UP VISIT: CPT | Mod: ,,, | Performed by: OBSTETRICS & GYNECOLOGY

## 2025-02-11 PROCEDURE — 99213 OFFICE O/P EST LOW 20 MIN: CPT | Mod: PBBFAC | Performed by: OBSTETRICS & GYNECOLOGY

## 2025-02-11 PROCEDURE — 1160F RVW MEDS BY RX/DR IN RCRD: CPT | Mod: CPTII,,, | Performed by: OBSTETRICS & GYNECOLOGY

## 2025-02-11 PROCEDURE — 3079F DIAST BP 80-89 MM HG: CPT | Mod: CPTII,,, | Performed by: OBSTETRICS & GYNECOLOGY

## 2025-02-11 PROCEDURE — 3075F SYST BP GE 130 - 139MM HG: CPT | Mod: CPTII,,, | Performed by: OBSTETRICS & GYNECOLOGY

## 2025-02-11 PROCEDURE — 1159F MED LIST DOCD IN RCRD: CPT | Mod: CPTII,,, | Performed by: OBSTETRICS & GYNECOLOGY

## 2025-02-11 RX ORDER — MONTELUKAST SODIUM 10 MG/1
10 TABLET ORAL
COMMUNITY
Start: 2025-01-23

## 2025-02-11 NOTE — PROGRESS NOTES
Subjective:    Patient ID: Jonas Aguilar is a 30 y.o. y.o. female    Chief Complaint:   Chief Complaint   Patient presents with    Post-op Evaluation     6 week post op and discuss pathology       History of Present Illness:  Jonas presents today for follow up of [facility] with salpingectomy about 6 weeks ago.  Patient states he has been feeling well and has no bleeding or discharge.    Pathology report revealed severe cervical dysplasia; he says that his most recent pap in [place] was within normal limits. Reports hadn't really begun to have penetrative sex with men until about a year ago or so.      Review of Systems   Constitutional:  Negative for chills and fever.   Respiratory:  Negative for shortness of breath.    Cardiovascular:  Negative for chest pain.   Gastrointestinal:  Negative for constipation.   Genitourinary:  Negative for pelvic pain, vaginal bleeding and vaginal discharge.   Neurological:  Negative for headaches.         Objective:    Vital Signs:  Vitals:    02/11/25 1326   BP: 132/81   Pulse: 108     Wt Readings from Last 1 Encounters:   02/11/25 79.4 kg (175 lb)     Body mass index is 33.07 kg/m².    Physical Exam:  General:  alert, no distress   Abdomen:   Soft, nontender   Pelvis: External genitalia: normal general appearance  Urinary system: urethral meatus normal, bladder nontender  Vaginal: normal mucosa without prolapse or lesions, cuff healing well  Cervix: removed surgically  Uterus: removed surgically  Adnexa: normal bimanual exam       Discussed cervical dysplasia and HPV. Also discussed that dysplasia can be aggressive in some cases. I recommend that he continue to receive yearly to q 3 yr paps. All questions answered        Assessment:      1. S/P hysterectomy    2. Female-to-male transgender person    3. Anxiety    4. Depression, unspecified depression type    5. Postop check    6. Severe dysplasia of cervix          Plan:      S/P hysterectomy    Female-to-male transgender  person    Anxiety    Depression, unspecified depression type    Postop check    Severe dysplasia of cervix    RTC for annual with pap       Corrine Hernandez MD, FACOG   02/11/2025 1:41 PM

## 2025-03-25 ENCOUNTER — OFFICE VISIT (OUTPATIENT)
Dept: ORTHOPEDICS | Facility: CLINIC | Age: 30
End: 2025-03-25
Payer: MEDICAID

## 2025-03-25 VITALS — BODY MASS INDEX: 32.63 KG/M2 | HEIGHT: 61 IN | WEIGHT: 172.81 LBS

## 2025-03-25 DIAGNOSIS — M77.8 SHOULDER TENDINITIS, LEFT: ICD-10-CM

## 2025-03-25 DIAGNOSIS — M25.512 ACUTE PAIN OF LEFT SHOULDER: Primary | ICD-10-CM

## 2025-03-25 DIAGNOSIS — M67.912 ROTATOR CUFF DISORDER, LEFT: ICD-10-CM

## 2025-03-25 PROCEDURE — 1160F RVW MEDS BY RX/DR IN RCRD: CPT | Mod: CPTII,,, | Performed by: NURSE PRACTITIONER

## 2025-03-25 PROCEDURE — 99213 OFFICE O/P EST LOW 20 MIN: CPT | Mod: S$PBB,,, | Performed by: NURSE PRACTITIONER

## 2025-03-25 PROCEDURE — 1159F MED LIST DOCD IN RCRD: CPT | Mod: CPTII,,, | Performed by: NURSE PRACTITIONER

## 2025-03-25 PROCEDURE — 99999 PR PBB SHADOW E&M-EST. PATIENT-LVL III: CPT | Mod: PBBFAC,,, | Performed by: NURSE PRACTITIONER

## 2025-03-25 PROCEDURE — 99213 OFFICE O/P EST LOW 20 MIN: CPT | Mod: PBBFAC | Performed by: NURSE PRACTITIONER

## 2025-03-25 PROCEDURE — 3008F BODY MASS INDEX DOCD: CPT | Mod: CPTII,,, | Performed by: NURSE PRACTITIONER

## 2025-03-25 NOTE — PROGRESS NOTES
Subjective:      Follow up visit: Left shoulder pain:     Jonas Aguilar is a 29 y.o. right handed female (trans to male)  presents for follow up for left sided shoulder pain. Patient has completed > 6 weeks (11/13/24-03/25/25) of conservative treatment, has done the Tylenol and physician directed exercises as reviewed 3 x wk.  Patient presents and continues with decreased ROM and increased pain with certain motions. The pain level is rated as 5/10 in the left shoulder but can be much worse with activity. The pain is over the lateral shoulder and anterior shoulder as previous. Symptoms are aggravated by lifting, ADL's, repetitive use and over head motions.      Medical History:          Past Medical History:   Diagnosis Date    ADHD (attention deficit hyperactivity disorder)      Anxiety      Asthma      Bipolar 1 disorder 06/10/2022    Connective tissue disorder      Depression      Fibromyalgia      Gender dysphoria      Gender identity disorder      MCTD (mixed connective tissue disease)      Papilledema      Sciatica      Vitamin D deficiency disease 06/30/2014         Surgical History:            Past Surgical History:   Procedure Laterality Date    COLONOSCOPY N/A 10/24/2024     Procedure: COLONOSCOPY;  Surgeon: Terry Brennan MD;  Location: [facility];  Service: Endoscopy;  Laterality: N/A;    LIPOSUCTION        MASTECTOMY        wisdom teeth removal             Family History:              Family History   Problem Relation Name Age of Onset    Colon cancer Father   70    Cancer Maternal Grandmother             breast         Allergies:           Review of patient's allergies indicates:   Allergen Reactions    Lactose Diarrhea         Review of Systems   Constitutional: Negative.  Negative for fever.   Musculoskeletal:  Positive for joint pain.   Skin: Negative.    Neurological: Negative.    Psychiatric/Behavioral: depression, anxiety  All other systems reviewed and are negative.      Objective:    NVI  General:  alert, appears stated age, and cooperative   Gait:  Normal.       Left Shoulder  Bruising:   absent   Crepitus:  absent   Joint Tenderness:  lateral acromion, rotator cuff   Active ROM:   AROM decreased, not end range   Neer:   positive   Pedro  positive    Speeds negative   Cross arm negative   Empty can positive    Drop arm negative   Stability:  normal   Apprehension Sign:  negative   Atrophy:   none noted   Strength:  biceps 5/5, triceps 5/5, abduction 4/5, adduction 4/5    Contralateral shoulder was without deficit  Brisk refill noted.      Imaging  LT shoulder reviewed from 11/04/24   No fracture or dislocation.   Unremarkable soft tissues.     Impression:     No acute abnormality.    Assessment:      LT shoulder pain, rotator cuff disorder     Plan:      MRI  LT shoulder to evaluate for rotator cuff disorder. No improvement greater than 6 weeks (11/13/24-03/25/25) of physician directed exercises and Tylenol.  Continues with rotator cuff tenderness and decreased ROM.  Continue the left Shoulder physician directed HEP. No Nsaids as previous.   3.   Ice and over head rest as directed. Continue Tylenol as previous.   4.   RTC post MRI for review.  5.   All questions were answered.

## 2025-03-27 RX ORDER — GABAPENTIN 300 MG/1
300 CAPSULE ORAL 3 TIMES DAILY
Qty: 90 CAPSULE | Refills: 5 | Status: SHIPPED | OUTPATIENT
Start: 2025-03-27

## 2025-04-02 ENCOUNTER — HOSPITAL ENCOUNTER (OUTPATIENT)
Dept: RADIOLOGY | Facility: HOSPITAL | Age: 30
Discharge: HOME | End: 2025-04-02
Attending: NURSE PRACTITIONER
Payer: MEDICAID

## 2025-04-02 DIAGNOSIS — M67.912 ROTATOR CUFF DISORDER, LEFT: ICD-10-CM

## 2025-04-02 DIAGNOSIS — M25.512 ACUTE PAIN OF LEFT SHOULDER: ICD-10-CM

## 2025-04-02 DIAGNOSIS — M77.8 SHOULDER TENDINITIS, LEFT: ICD-10-CM

## 2025-04-02 PROCEDURE — 73221 MRI JOINT UPR EXTREM W/O DYE: CPT | Mod: TC,LT

## 2025-04-07 ENCOUNTER — OFFICE VISIT (OUTPATIENT)
Dept: ORTHOPEDICS | Facility: CLINIC | Age: 30
End: 2025-04-07
Payer: MEDICAID

## 2025-04-07 DIAGNOSIS — M67.912 ROTATOR CUFF DISORDER, LEFT: ICD-10-CM

## 2025-04-07 DIAGNOSIS — M25.512 ACUTE PAIN OF LEFT SHOULDER: Primary | ICD-10-CM

## 2025-04-07 DIAGNOSIS — M77.8 SHOULDER TENDINITIS, LEFT: ICD-10-CM

## 2025-04-07 DIAGNOSIS — S46.012A SUPRASPINATUS TENDON RUPTURE, LEFT, INITIAL ENCOUNTER: ICD-10-CM

## 2025-04-07 PROCEDURE — 99213 OFFICE O/P EST LOW 20 MIN: CPT | Mod: S$PBB,,, | Performed by: NURSE PRACTITIONER

## 2025-04-07 PROCEDURE — 99999 PR PBB SHADOW E&M-EST. PATIENT-LVL III: CPT | Mod: PBBFAC,,, | Performed by: NURSE PRACTITIONER

## 2025-04-07 PROCEDURE — 99213 OFFICE O/P EST LOW 20 MIN: CPT | Mod: PBBFAC | Performed by: NURSE PRACTITIONER

## 2025-04-07 PROCEDURE — 1159F MED LIST DOCD IN RCRD: CPT | Mod: CPTII,,, | Performed by: NURSE PRACTITIONER

## 2025-04-07 PROCEDURE — 1160F RVW MEDS BY RX/DR IN RCRD: CPT | Mod: CPTII,,, | Performed by: NURSE PRACTITIONER

## 2025-04-07 NOTE — PROGRESS NOTES
Subjective:      Follow up visit: Left shoulder MRI     Jonas Aguilar is a 30 y.o. right handed female (trans to male)  presents for follow up for left sided shoulder pain. Patient presents for MRI review. Patient presents and continues with decreased ROM and increased pain with certain motions. The pain level is rated as 5/10 in the left shoulder but can be much worse with activity. The pain is over the lateral shoulder and anterior shoulder as previous. Symptoms are aggravated by lifting, ADL's, repetitive use and over head motions.      Medical History:          Past Medical History:   Diagnosis Date    ADHD (attention deficit hyperactivity disorder)      Anxiety      Asthma      Bipolar 1 disorder 06/10/2022    Connective tissue disorder      Depression      Fibromyalgia      Gender dysphoria      Gender identity disorder      MCTD (mixed connective tissue disease)      Papilledema      Sciatica      Vitamin D deficiency disease 06/30/2014         Surgical History:            Past Surgical History:   Procedure Laterality Date    COLONOSCOPY N/A 10/24/2024     Procedure: COLONOSCOPY;  Surgeon: Terry Brennan MD;  [facility];  Service: Endoscopy;  Laterality: N/A;    LIPOSUCTION        MASTECTOMY        wisdom teeth removal             Family History:              Family History   Problem Relation Name Age of Onset    Colon cancer Father   70    Cancer Maternal Grandmother             breast         Allergies:           Review of patient's allergies indicates:   Allergen Reactions    Lactose Diarrhea         Review of Systems   Constitutional: Negative.  Negative for fever.   Musculoskeletal:  Positive for joint pain.   Skin: Negative.    Neurological: Negative.    Psychiatric/Behavioral: depression, anxiety  All other systems reviewed and are negative.      Objective:   NVI  General:  alert, appears stated age, and cooperative   Gait:  Normal.       Left Shoulder  Bruising:   absent   Crepitus:   absent   Joint Tenderness:  lateral acromion, rotator cuff   Active ROM:   AROM decreased   Neer:   positive   Pedro  positive    Speeds negative   Cross arm negative   Empty can positive    Drop arm negative   Stability:  normal   Apprehension Sign:  negative   Atrophy:   none noted   Strength:  biceps 5/5, triceps 5/5, abduction 4/5, adduction 4/5    Contralateral shoulder was without deficit  Brisk refill noted.      Imaging  LT shoulder reviewed from 11/04/24   No fracture or dislocation.   Unremarkable soft tissues.     Impression:     No acute abnormality.    LT Shoulder MRI: Reviewed by me today  1. Near complete supraspinatus tendon tear with subacromial-subdeltoid effusion  2. Diffuse abnormal bone marrow signal intensity suggests red marrow conversion or marrow replacement process     Assessment:      LT shoulder pain, rotator cuff disorder     Plan:      MRI LT shoulder was reviewed, consistent with a near complete supraspinatus tendon tear  Referral to shoulder specialist fo surgical evaluation.   Continue the left Shoulder physician directed HEP. No Nsaids as previous.   3.   Ice and over head rest as directed. Continue Tylenol as previous.   4.   RTC prn.  5.   All questions were answered.

## 2025-04-10 ENCOUNTER — RESULTS FOLLOW-UP (OUTPATIENT)
Dept: PRIMARY CARE CLINIC | Facility: CLINIC | Age: 30
End: 2025-04-10

## 2025-04-10 ENCOUNTER — LAB VISIT (OUTPATIENT)
Dept: LAB | Facility: HOSPITAL | Age: 30
End: 2025-04-10
Attending: NURSE PRACTITIONER
Payer: MEDICAID

## 2025-04-10 DIAGNOSIS — Z78.9 FEMALE-TO-MALE TRANSGENDER PERSON: ICD-10-CM

## 2025-04-10 DIAGNOSIS — Z79.890 HORMONE REPLACEMENT THERAPY (HRT): ICD-10-CM

## 2025-04-10 DIAGNOSIS — Z78.9 PERSON LIVING IN RESIDENTIAL INSTITUTION: Primary | ICD-10-CM

## 2025-04-10 LAB
ABSOLUTE EOSINOPHIL (OHS): 0.22 K/UL
ABSOLUTE MONOCYTE (OHS): 1.2 K/UL (ref 0.3–1)
ABSOLUTE NEUTROPHIL COUNT (OHS): 4.03 K/UL (ref 1.8–7.7)
ALBUMIN SERPL BCP-MCNC: 4.2 G/DL (ref 3.5–5.2)
ALP SERPL-CCNC: 87 UNIT/L (ref 40–150)
ALT SERPL W/O P-5'-P-CCNC: 22 UNIT/L (ref 10–44)
ANION GAP (OHS): 11 MMOL/L (ref 8–16)
AST SERPL-CCNC: 22 UNIT/L (ref 11–45)
BASOPHILS # BLD AUTO: 0.07 K/UL
BASOPHILS NFR BLD AUTO: 0.7 %
BILIRUB SERPL-MCNC: 0.3 MG/DL (ref 0.1–1)
BUN SERPL-MCNC: 13 MG/DL (ref 6–20)
CALCIUM SERPL-MCNC: 9.5 MG/DL (ref 8.7–10.5)
CHLORIDE SERPL-SCNC: 101 MMOL/L (ref 95–110)
CO2 SERPL-SCNC: 27 MMOL/L (ref 23–29)
CREAT SERPL-MCNC: 1.1 MG/DL (ref 0.5–1.4)
ERYTHROCYTE [DISTWIDTH] IN BLOOD BY AUTOMATED COUNT: 12.6 % (ref 11.5–14.5)
GFR SERPLBLD CREATININE-BSD FMLA CKD-EPI: >60 ML/MIN/1.73/M2
GLUCOSE SERPL-MCNC: 85 MG/DL (ref 70–110)
HCT VFR BLD AUTO: 45.5 % (ref 40–54)
HGB BLD-MCNC: 15.3 GM/DL (ref 14–18)
IMM GRANULOCYTES # BLD AUTO: 0.04 K/UL (ref 0–0.04)
IMM GRANULOCYTES NFR BLD AUTO: 0.4 % (ref 0–0.5)
LYMPHOCYTES # BLD AUTO: 4.9 K/UL (ref 1–4.8)
MCH RBC QN AUTO: 29.5 PG (ref 27–31)
MCHC RBC AUTO-ENTMCNC: 33.6 G/DL (ref 32–36)
MCV RBC AUTO: 88 FL (ref 82–98)
NUCLEATED RBC (/100WBC) (OHS): 0 /100 WBC
PLATELET # BLD AUTO: 271 K/UL (ref 150–450)
PMV BLD AUTO: 10.9 FL (ref 9.2–12.9)
POTASSIUM SERPL-SCNC: 4.4 MMOL/L (ref 3.5–5.1)
PROT SERPL-MCNC: 7.4 GM/DL (ref 6–8.4)
RBC # BLD AUTO: 5.18 M/UL (ref 4.6–6.2)
RELATIVE EOSINOPHIL (OHS): 2.1 %
RELATIVE LYMPHOCYTE (OHS): 46.8 % (ref 18–48)
RELATIVE MONOCYTE (OHS): 11.5 % (ref 4–15)
RELATIVE NEUTROPHIL (OHS): 38.5 % (ref 38–73)
SODIUM SERPL-SCNC: 139 MMOL/L (ref 136–145)
TESTOST SERPL-MCNC: 116 NG/DL (ref 304–1227)
WBC # BLD AUTO: 10.46 K/UL (ref 3.9–12.7)

## 2025-04-10 PROCEDURE — 85025 COMPLETE CBC W/AUTO DIFF WBC: CPT

## 2025-04-10 PROCEDURE — 82310 ASSAY OF CALCIUM: CPT

## 2025-04-10 PROCEDURE — 36415 COLL VENOUS BLD VENIPUNCTURE: CPT

## 2025-04-10 PROCEDURE — 84403 ASSAY OF TOTAL TESTOSTERONE: CPT

## 2025-04-22 ENCOUNTER — OFFICE VISIT (OUTPATIENT)
Dept: PRIMARY CARE CLINIC | Facility: CLINIC | Age: 30
End: 2025-04-22
Payer: MEDICAID

## 2025-04-22 VITALS
OXYGEN SATURATION: 98 % | TEMPERATURE: 98 F | HEIGHT: 61 IN | SYSTOLIC BLOOD PRESSURE: 111 MMHG | HEART RATE: 79 BPM | WEIGHT: 168.88 LBS | BODY MASS INDEX: 31.88 KG/M2 | DIASTOLIC BLOOD PRESSURE: 70 MMHG | RESPIRATION RATE: 18 BRPM

## 2025-04-22 DIAGNOSIS — G89.29 CHRONIC PAIN OF MULTIPLE JOINTS: Primary | ICD-10-CM

## 2025-04-22 DIAGNOSIS — Z90.711 HISTORY OF PARTIAL HYSTERECTOMY: ICD-10-CM

## 2025-04-22 DIAGNOSIS — M25.50 CHRONIC PAIN OF MULTIPLE JOINTS: Primary | ICD-10-CM

## 2025-04-22 DIAGNOSIS — K58.2 IRRITABLE BOWEL SYNDROME WITH BOTH CONSTIPATION AND DIARRHEA: ICD-10-CM

## 2025-04-22 DIAGNOSIS — M79.7 FIBROMYALGIA: ICD-10-CM

## 2025-04-22 DIAGNOSIS — Z98.890 PONV (POSTOPERATIVE NAUSEA AND VOMITING): ICD-10-CM

## 2025-04-22 DIAGNOSIS — R11.2 PONV (POSTOPERATIVE NAUSEA AND VOMITING): ICD-10-CM

## 2025-04-22 PROBLEM — Z90.710 HISTORY OF TOTAL HYSTERECTOMY: Status: ACTIVE | Noted: 2025-04-22

## 2025-04-22 PROCEDURE — 3074F SYST BP LT 130 MM HG: CPT | Mod: CPTII,,, | Performed by: NURSE PRACTITIONER

## 2025-04-22 PROCEDURE — 3008F BODY MASS INDEX DOCD: CPT | Mod: CPTII,,, | Performed by: NURSE PRACTITIONER

## 2025-04-22 PROCEDURE — 3078F DIAST BP <80 MM HG: CPT | Mod: CPTII,,, | Performed by: NURSE PRACTITIONER

## 2025-04-22 PROCEDURE — 99214 OFFICE O/P EST MOD 30 MIN: CPT | Mod: S$PBB,,, | Performed by: NURSE PRACTITIONER

## 2025-04-22 PROCEDURE — 99214 OFFICE O/P EST MOD 30 MIN: CPT | Mod: PBBFAC | Performed by: NURSE PRACTITIONER

## 2025-04-22 PROCEDURE — 99999 PR PBB SHADOW E&M-EST. PATIENT-LVL IV: CPT | Mod: PBBFAC,,, | Performed by: NURSE PRACTITIONER

## 2025-04-22 NOTE — PROGRESS NOTES
[facility] Note    HPI:  Jonas Aguilar is a 30 y.o. adult who presents today for Hip Pain, Knee Pain (Pt here for knee/hip/thumb pain), and thumb pain    Right thumb pain for a few weeks, worse when pinching motion    Bilateral knee popping and feel loose, recently fell when right knee gave out on him, right hip tends to turn out, bilateral hip pain, pains may be all related to weak muscle in right leg    Is not taking Vitamin D supplement    Had MRI left shoulder recently, was supposed to have referral to surgeon but has not heard anything, this pain has been since colonoscopy in November 2024    ROS   A review of systems was performed and was negative except as noted above.    I personally reviewed allergies, past medical, surgical, social and family history and updated as appropriate.    Medications:  Current Medications[1]     Health Maintenance:  Immunization History   Administered Date(s) Administered    DTP 1997, 1997, 1997, 07/06/1998    DTaP 04/22/2002    HIB 1997, 1997, 1997, 07/06/1998    HPV 9-Valent 07/22/2022    Hepatitis B, Pediatric/Adolescent 1997, 1997, 1997    IPV 04/22/2002    Influenza - Trivalent - Fluarix, Flulaval, Fluzone, Afluria - PF 11/04/2024    MMR 02/19/1998, 04/22/2002    Meningococcal Conjugate (MCV4P) 07/07/2008    OPV 1997, 1997, 1997    Pneumococcal Conjugate - 20 Valent 11/04/2024    Tdap 07/07/2008, 05/21/2024    Varicella 02/19/1998, 07/07/2008      Health Maintenance   Topic Date Due    COVID-19 Vaccine (1 - 2024-25 season) 11/04/2025 (Originally 9/1/2024)    TETANUS VACCINE  05/21/2034    RSV Vaccine (Age 60+ and Pregnant patients) (1 - 1-dose 75+ series) 01/01/2072    Hepatitis C Screening  Completed    Influenza Vaccine  Completed    HIV Screening  Completed    Pneumococcal Vaccines (Age 0-49)  Completed    Lipid Panel  Completed     Health Maintenance Topics with due status:  "Not Due       Topic Last Completion Date    TETANUS VACCINE 05/21/2024    RSV Vaccine (Age 60+ and Pregnant patients) Not Due     There are no preventive care reminders to display for this patient.    PHYSICAL EXAM:  Vitals:    04/22/25 1458   BP: 111/70   Pulse: 79   Resp: 18   Temp: 98.1 °F (36.7 °C)   TempSrc: Oral   SpO2: 98%   Weight: 76.6 kg (168 lb 14.4 oz)   Height: 5' 1" (1.549 m)     Body mass index is 31.91 kg/m².  Physical Exam     ASSESSMENT/PLAN:  1. Chronic pain of multiple joints  -     Ambulatory referral/consult to Pain Clinic; Future; Expected date: 04/22/2025  -     Ambulatory Referral/Consult to Physical Therapy; Future; Expected date: 04/22/2025        Other than changes above, continue current medications and maintain follow up with specialists.      No follow-ups on file.   Recent Results (from the past 12 weeks)   Comprehensive Metabolic Panel    Collection Time: 04/10/25  9:03 AM   Result Value Ref Range    Sodium 139 136 - 145 mmol/L    Potassium 4.4 3.5 - 5.1 mmol/L    Chloride 101 95 - 110 mmol/L    CO2 27 23 - 29 mmol/L    Glucose 85 70 - 110 mg/dL    BUN 13 6 - 20 mg/dL    Creatinine 1.1 0.5 - 1.4 mg/dL    Calcium 9.5 8.7 - 10.5 mg/dL    Protein Total 7.4 6.0 - 8.4 gm/dL    Albumin 4.2 3.5 - 5.2 g/dL    Bilirubin Total 0.3 0.1 - 1.0 mg/dL    ALP 87 40 - 150 unit/L    AST 22 11 - 45 unit/L    ALT 22 10 - 44 unit/L    Anion Gap 11 8 - 16 mmol/L    eGFR >60 >60 mL/min/1.73/m2   TESTOSTERONE    Collection Time: 04/10/25  9:03 AM   Result Value Ref Range    Testosterone Total 116 (L) 304 - 1,227 ng/dL   CBC with Differential    Collection Time: 04/10/25  9:03 AM   Result Value Ref Range    WBC 10.46 3.90 - 12.70 K/uL    RBC 5.18 4.60 - 6.20 M/uL    HGB 15.3 14.0 - 18.0 gm/dL    HCT 45.5 40.0 - 54.0 %    MCV 88 82 - 98 fL    MCH 29.5 27.0 - 31.0 pg    MCHC 33.6 32.0 - 36.0 g/dL    RDW 12.6 11.5 - 14.5 %    Platelet Count 271 150 - 450 K/uL    MPV 10.9 9.2 - 12.9 fL    Nucleated RBC 0 <=0 " "/100 WBC    Neut % 38.5 38 - 73 %    Lymph % 46.8 18 - 48 %    Mono % 11.5 4 - 15 %    Eos % 2.1 <=8 %    Basophil % 0.7 <=1.9 %    Imm Grans % 0.4 0.0 - 0.5 %    Neut # 4.03 1.8 - 7.7 K/uL    Lymph # 4.90 (H) 1 - 4.8 K/uL    Mono # 1.20 (H) 0.3 - 1 K/uL    Eos # 0.22 <=0.5 K/uL    Baso # 0.07 <=0.2 K/uL    Imm Grans # 0.04 0.00 - 0.04 K/uL         Khalida Wesley, FNP [facility]                     [1]   Current Outpatient Medications:     albuterol (PROVENTIL/VENTOLIN HFA) 90 mcg/actuation inhaler, 2 puffs every 6 (six) hours as needed., Disp: , Rfl:     atomoxetine (STRATTERA) 80 MG capsule, Take 80 mg by mouth every morning., Disp: , Rfl:     BD LUER-GENARO SYRINGE 1 mL Syrg, use as directed, Disp: , Rfl:     cholecalciferol, vitamin D3, (VITAMIN D3) 2,000 unit Cap, Take 1 capsule by mouth once daily., Disp: , Rfl:     clindamycin (CLEOCIN T) 1 % lotion, Apply topically., Disp: , Rfl:     docusate sodium (COLACE) 100 MG capsule, Take 1 capsule (100 mg total) by mouth 2 (two) times daily., Disp: 60 capsule, Rfl: 1    DULoxetine 40 mg CpDR, Take 2 capsules by mouth., Disp: , Rfl:     finasteride (PROSCAR) 5 mg tablet, Take 5 mg by mouth once daily., Disp: , Rfl:     gabapentin (NEURONTIN) 300 MG capsule, Take 1 capsule (300 mg total) by mouth 3 (three) times daily., Disp: 90 capsule, Rfl: 5    minoxidiL (LONITEN) 2.5 MG tablet, Take 2.5 mg by mouth every evening., Disp: , Rfl:     montelukast (SINGULAIR) 10 mg tablet, Take 10 mg by mouth., Disp: , Rfl:     needle, disp, 18 G 18 gauge x 1" Ndle, BD Regular Bevel Needles 18 gauge x 1"  USE 1 EACH EVERY 7 DAYS TO DRAW UP TESTOTERONE, Disp: , Rfl:     syringe with needle 3 mL 21 gauge x 1 1/2" Syrg, BD Luer-Genaro Syringe 3 mL 21 gauge x 1 1/2"  USE 1 SYRINGE FOR TESTOSTERONE INJ EVERY 7 DAYS.(SYRINGE SIZE DIFFERENT/MD), Disp: , Rfl:     testosterone cypionate (DEPOTESTOTERONE CYPIONATE) 200 mg/mL injection, SMARTSI.4 Milliliter(s) IM Once a Week, Disp: , Rfl: "     UNKNOWN TO PATIENT, PKG 3.5G VERONICA DANG, Disp: , Rfl:

## 2025-04-29 ENCOUNTER — TELEPHONE (OUTPATIENT)
Dept: ORTHOPEDICS | Facility: CLINIC | Age: 30
End: 2025-04-29
Payer: MEDICAID

## 2025-04-29 DIAGNOSIS — M25.512 ACUTE PAIN OF LEFT SHOULDER: ICD-10-CM

## 2025-04-29 DIAGNOSIS — S46.012A SUPRASPINATUS TENDON RUPTURE, LEFT, INITIAL ENCOUNTER: Primary | ICD-10-CM

## 2025-04-29 NOTE — TELEPHONE ENCOUNTER
----- Message from Jer Quiroz sent at 4/29/2025  2:16 PM CDT -----  Regarding: Scheduling  Jonas Garner Minerva: [id number]: 1995MESSAGE:  Good afternoon. My provider has put in a referral to shoulder specialty, if someone can please call the pt for scheduling. His preferred contact method is through his mother listed as his emergency contact. Thanks, Gabriella Oliva, CMA

## 2025-04-30 ENCOUNTER — TELEPHONE (OUTPATIENT)
Dept: ORTHOPEDICS | Facility: CLINIC | Age: 30
End: 2025-04-30
Payer: MEDICAID

## 2025-04-30 NOTE — TELEPHONE ENCOUNTER
Called patient with date/time for upcoming appointment. Pt understood new date/time. Phone call ended.

## 2025-05-02 ENCOUNTER — CLINICAL SUPPORT (OUTPATIENT)
Facility: HOSPITAL | Age: 30
End: 2025-05-02
Payer: MEDICAID

## 2025-05-02 DIAGNOSIS — M62.512 MUSCLE WASTING AND ATROPHY, NOT ELSEWHERE CLASSIFIED, LEFT SHOULDER: ICD-10-CM

## 2025-05-02 DIAGNOSIS — G89.29 CHRONIC PAIN OF MULTIPLE JOINTS: ICD-10-CM

## 2025-05-02 DIAGNOSIS — M25.50 CHRONIC PAIN OF MULTIPLE JOINTS: ICD-10-CM

## 2025-05-02 DIAGNOSIS — M25.612 STIFFNESS OF LEFT SHOULDER JOINT: Primary | ICD-10-CM

## 2025-05-02 PROCEDURE — 97110 THERAPEUTIC EXERCISES: CPT

## 2025-05-02 PROCEDURE — 97162 PT EVAL MOD COMPLEX 30 MIN: CPT

## 2025-05-02 NOTE — PROGRESS NOTES
Outpatient Rehab    Physical Therapy Evaluation    Patient Name: Alexx Aguilar  MRN: [id number]  YOB: 1995  Encounter Date: 5/2/2025    Therapy Diagnosis:   Encounter Diagnoses   Name Primary?    Chronic pain of multiple joints     Stiffness of left shoulder joint Yes    Muscle wasting and atrophy, not elsewhere classified, left shoulder      Physician: Khalida Wesley NP    Physician Orders: Eval and Treat  Medical Diagnosis: Chronic pain of multiple joints    Visit # / Visits Authorized:  1 / 1  Insurance Authorization Period: 4/22/2025 to 4/22/2026  Date of Evaluation: 5/2/2025  Plan of Care Certification: 5/2/2025 to 6/27/25     Time In: 1300   Time Out: 1400  Total Time: 60   Total Billable Time:      Intake Outcome Measure for FOTO Survey    Therapist reviewed FOTO scores for Alexx Aguilar on 5/2/2025.   FOTO report - see Media section or FOTO account episode details.     Intake Score: 44%         Subjective   History of Present Illness  Alexx is a 30 y.o. male who reports to physical therapy with a chief concern of L shoulder pain.     The patient reports a medical diagnosis of S46.012A (ICD-10-CM) - Supraspinatus tendon rupture, left, initial encounter  M25.512 (ICD-10-CM) - Acute pain of left shoulder. The patient has experienced this issue since 04/29/25.   Diagnostic tests related to this condition: MRI studies.   MRI Studies Details: 1. Near complete supraspinatus tendon tear with subacromial-subdeltoid effusion  2. Diffuse abnormal bone marrow signal intensity suggests red marrow conversion or marrow replacement process    Dominant Hand: Right  History of Present Condition/Illness: Patient reports pain in his shoulder since a colonoscopy in November 2024. Patient reports he has been dx with Fibromyalgia and mixed connective tissue disorder leading to significant pain in his B hips and knees. He reports this pain is fairly consistent. Patient reports his shoulder pain is his most  significant deficit, but his knees and hips are also having significant pain. He reports multiple falls. Patient reports he is seeing orthopedist for his shoulder injury for surgical consult.     Activities of Daily Living  Social history was obtained from Patient.               Previously independent with activities of daily living? Yes     Currently independent with activities of daily living? Yes          Previously independent with instrumental activities of daily living? Yes     Currently independent with instrumental activities of daily living? Yes              Pain     Patient reports a current pain level of 7/10. Pain at best is reported as 3/10. Pain at worst is reported as 9/10.   Location: General L shoulder  Clinical Progression (since onset): Worsening  Pain Qualities: Sharp, Dull, Aching  Pain-Relieving Factors: Rest  Pain-Aggravating Factors: Reaching, Lifting, Holding objects, Cooking         Review of Systems  Patient reports: Stomach History  Patient denies: Bladder Incontinence, Bowel Incontinence, Chest Pain, Dizziness, Fainting, Fever, Headache, Lower Extremity Neurological Deficits, Motion Sickness, Nausea, Night Sweats/Chills, Night Pain, Saddle Numbness, Shortness of Breath, Sleep Disturbance, Tinnitus, Weight Gain, Weight Loss, Cancer History, Cardiac History, Diabetes, Gallbladder History, Immunosuppression History, Kidney History, Osteoarthritis, Recent Infection History, Rheumatoid Arthritis, Trauma History, and Ulcer History        Employment  Employment Status: Not employed   Patient attempting to get on disability       Past Medical History/Physical Systems Review:   Jonas Aguilar  has a past medical history of Anxiety, Depression, Fibromyalgia, Gender dysphoria, MCTD (mixed connective tissue disease), Papilledema, PONV (postoperative nausea and vomiting), Sciatica, and Vitamin D deficiency disease.    Jonas Aguilar  has a past surgical history that includes Mastectomy;  Liposuction; wisdom teeth removal; Colonoscopy (N/A, 10/24/2024); and hysterectomy, total, laparoscopic, with salpingectomy (Bilateral, 1/2/2025).    Jonas has a current medication list which includes the following prescription(s): albuterol, atomoxetine, clindamycin, duloxetine, finasteride, gabapentin, linaclotide, minoxidil, needle (disp) 18 g, syringe with needle, testosterone cypionate, and UNKNOWN TO PATIENT.    Review of patient's allergies indicates:   Allergen Reactions    Dilaudid [hydromorphone]     Lactose Diarrhea    Penicillins         Objective      Shoulder Range of Motion  Right Shoulder   Active (deg) Passive (deg) Pain   Flexion 180       Extension         Scaption         ABduction 180       ADduction         Horizontal ABduction         Horizontal ADduction         External Rotation (Shoulder ABducted 0 degrees) 80       External Rotation (Shoulder ABducted 45 degrees)         External Rotation (Shoulder ABducted 90 degrees)         Internal Rotation (Shoulder ABducted 0 degrees)  (INferior angle scapula)       Internal Rotation (Shoulder ABducted 45 degrees)         Internal Rotation (Shoulder ABducted 90 degrees)           Left Shoulder   Active (deg) Passive (deg) Pain   Flexion 120       Extension         Scaption         ABduction 121       ADduction         Horizontal ABduction         Horizontal ADduction         External Rotation (Shoulder ABducted 0 degrees) 78       External Rotation (Shoulder ABducted 45 degrees)         External Rotation (Shoulder ABducted 90 degrees)         Internal Rotation (Shoulder ABducted 0 degrees)  (L4)       Internal Rotation (Shoulder ABducted 45 degrees)         Internal Rotation (Shoulder ABducted 90 degrees)                       Shoulder Strength - Planes of Motion   Right Strength Right Pain Left Strength Left  Pain   Flexion 5   2+     Extension 5         ABduction 5   2+     ADduction           Horizontal ABduction           Horizontal ADduction            Internal Rotation 0° 5   4-     Internal Rotation 90°           External Rotation 0° 5   3+     External Rotation 90°                         Shoulder Special Tests  Shoulder Stability Tests  Positive: Left Apprehension  Shoulder Labral Tests  Positive: Left Clunk  Positive: Left Hansford's  Rotator Cuff Tests  Positive: Left Empty Can  Impingement Tests  Positive: Left Pedro-Hugo and Left Painful Arc              Treatment:  Therapeutic Exercise  TE 1: HEP Review  Manual Therapy  MT 1: TDN infraspinatus and supraspinatus with stim  MT 2: INfraspinatus and supraspinatus soft tissue mobilizations      Time Entry(in minutes):  Manual Therapy Time Entry: 25  Therapeutic Exercise Time Entry: 10    Assessment & Plan   Assessment  Alexx presents with a condition of Moderate complexity.   Presentation of Symptoms: Evolving  Will Comorbidities Impact Care: Yes       Functional Limitations: Activity tolerance, Completing self-care activities, Range of motion, Participating in leisure activities, Pain with ADLs/IADLs, Gross motor coordination, Pain when reaching, Performing household chores, Reaching, Sexual function  Impairments: Pain with functional activity, Impaired physical strength  Personal Factors Affecting Prognosis: Pain, Physical limitations    Patient Goal for Therapy (PT): Decrease pain in L shoulder to be able to perform all ADLs pain free  Prognosis: Excellent  Assessment Details: Patient demonstrates deficits with range of motion, strength, and function that limit ability to participate in functional activities. Patient is demonstrating deficits with reaching, lifiting, and reaching behind his back secondary to pain and weakness. Patient is demonstrating positive impingement testing in L shoulder. Patient is demonstrating deficits with reaching overhead and reaching behind his back. Patient would benefit from skilled PT services to normalize kinetic chain mobility, strength, and function to safely  return to their prior level of activity.    Plan  From a physical therapy perspective, the patient would benefit from: Skilled Rehab Services    Planned therapy interventions include: Therapeutic exercise, Therapeutic activities, Neuromuscular re-education, Manual therapy, and ADLs/IADLs.    Planned modalities to include: Electrical stimulation - attended, Electrical stimulation - passive/unattended, Thermotherapy (hot pack), and Cryotherapy (cold pack).        Visit Frequency: 2 times Per Week for 8 Weeks.       This plan was discussed with Patient.   Discussion participants: Agreed Upon Plan of Care  Plan details: Frequency and duration of treatment to be adjusted as needed          Patient's spiritual, cultural, and educational needs considered and patient agreeable to plan of care and goals.     Education  Education was done with Patient. The patient's learning style includes Demonstration. The patient Demonstrates understanding and Verbalizes understanding.                 Goals:   Active       Functional outcome       Patient will show a significant change in FOTO patient-reported outcome tool to demonstrate subjective improvement       Start:  05/02/25    Expected End:  06/27/25            Patient stated goal: Decrease pain in L shoulder to be able to perform all ADLs pain free        Start:  05/02/25    Expected End:  06/27/25            Patient will demonstrate independence in home program for support of progression       Start:  05/02/25    Expected End:  06/27/25               Hand and arm use       Patient will reach overhead and behind back with no shoulder pain        Start:  05/02/25    Expected End:  06/27/25               Lifting & carrying objects       Patient will lift all objects for ADLs with no L shoulder pain        Start:  05/02/25    Expected End:  06/27/25               Pain       Patient will report pain of 0/10 demonstrating a reduction of overall pain       Start:  05/02/25    Expected  End:  06/27/25               Range of Motion       Patient will achieve left shoulder flexion of 175 degrees       Start:  05/02/25    Expected End:  06/27/25            Patient will achieve left shoulder internal rotation of contralateral inferior angle degrees in neutral       Start:  05/02/25    Expected End:  06/27/25               Strength       Patient will achieve left shoulder flexion strength of 5/5       Start:  05/02/25    Expected End:  06/27/25            Patient will achieve left shoulder abduction strength of 5/5       Start:  05/02/25    Expected End:  06/27/25            Patient will achieve left shoulder external rotation strength of 5/5 in neutral       Start:  05/02/25    Expected End:  06/27/25                Cj Joiner, PT, DPT, MTC  5/2/2025

## 2025-05-05 ENCOUNTER — TELEPHONE (OUTPATIENT)
Dept: ORTHOPEDICS | Facility: CLINIC | Age: 30
End: 2025-05-05
Payer: MEDICAID

## 2025-05-05 NOTE — TELEPHONE ENCOUNTER
Pt's mother was confused about who the patient will be seeing. States the patient was referred to Dr. Goveas. Pt's mom states she does not want to continue to see a bunch of providers to get to the surgeon.  Advised pt's mom that Argenis is Dr. Gautam's Physician's assistant, and she will be able to communicate with Dr. Gautam in order to set up pt's surgery if they deem it necessary. Pt's mother verbalized understanding and states she will keep appointment with Argenis and better understands the process!   ----- Message from [place] sent at 5/5/2025  1:18 PM CDT -----  Type: General Call Back Name of Caller:pt's mother Jeannie IglesiasMontserratms:upcoming apptWould the patient rather a call back or a response via [facility]? Call [facility] Call Back Number:[phone] Information: Pt's mother is trying to get clarity on what the appt is for and who will be doing the surgery.

## 2025-05-06 ENCOUNTER — CLINICAL SUPPORT (OUTPATIENT)
Facility: HOSPITAL | Age: 30
End: 2025-05-06
Payer: MEDICAID

## 2025-05-06 DIAGNOSIS — M25.612 STIFFNESS OF LEFT SHOULDER JOINT: ICD-10-CM

## 2025-05-06 DIAGNOSIS — M62.512 MUSCLE WASTING AND ATROPHY, NOT ELSEWHERE CLASSIFIED, LEFT SHOULDER: ICD-10-CM

## 2025-05-06 DIAGNOSIS — M25.50 CHRONIC PAIN OF MULTIPLE JOINTS: Primary | ICD-10-CM

## 2025-05-06 DIAGNOSIS — G89.29 CHRONIC PAIN OF MULTIPLE JOINTS: Primary | ICD-10-CM

## 2025-05-06 PROCEDURE — 97110 THERAPEUTIC EXERCISES: CPT

## 2025-05-06 NOTE — PROGRESS NOTES
Outpatient Rehab  Physical Therapy Daily Note    Patient Name: Alexx Aguilar  MRN: [id number]  YOB: 1995  Encounter Date: 5/6/2025    Therapy Diagnosis:   Encounter Diagnoses   Name Primary?    Chronic pain of multiple joints Yes    Stiffness of left shoulder joint     Muscle wasting and atrophy, not elsewhere classified, left shoulder      Physician: Khalida Wesley NP    Physician Orders: Eval and Treat  Medical Diagnosis: Chronic pain of multiple joints    Visit # / Visits Authorized:  1 / 16  Insurance Authorization Period: 5/3/2025 to 6/27/2025  Date of Evaluation: 5/2/2025  Plan of Care Certification: 5/2/2025 to 6/27/25      PT/PTA: PT   Number of PTA visits since last PT visit:0  Time In: 1300   Time Out: 1400  Total Time (in minutes): 60   Total Billable Time (in minutes): 60    FOTO:  Intake Score:  %  Survey Score 2:  %  Survey Score 3:  %         Subjective   Patient reports some soreness after his last treatment session with TDN; however, willing to do it again. Patient reports performing exercises taught by Lobito Soler NP. Patient reports he has a surgery appt coming up for (L) shoulder. Patient did not rate pain on pain scale..         Objective            Treatment:  Therapeutic Exercise  TE 1: scapular retractions YTB 3x10  TE 2: shoulder abduction YTB 3x10  TE 3: D2 flexion YTB 3x10  TE 4: pulleys flexion and scaption 3x10  TE 5: shoulder shrugs 3x10  TE 6: (B) shoulder ER YTB 3x10  Manual Therapy  MT 1: TDN infraspinatus and supraspinatus with stim      Time Entry(in minutes):  Manual Therapy Time Entry: 15  Therapeutic Exercise Time Entry: 45    Assessment & Plan   Assessment: Patient with good response to therapy treatment today. Patient reports soreness following TDN but positive response. Patient encouraged to continue performing HEP. Continue with current POC.       Patient will continue to benefit from skilled outpatient physical therapy to address the deficits listed in  the problem list box on initial evaluation, provide pt/family education and to maximize pt's level of independence in the home and community environment.     Patient's spiritual, cultural, and educational needs considered and patient agreeable to plan of care and goals.           Plan: Planned therapy interventions include: Therapeutic exercise, Therapeutic activities, Neuromuscular re-education, Manual therapy, and ADLs/IADLs.    Planned modalities to include: Electrical stimulation - attended, Electrical stimulation - passive/unattended, Thermotherapy (hot pack), and Cryotherapy (cold pack).         Visit Frequency: 2 times Per Week for 8 Weeks.    Goals:   Active       Functional outcome       Patient will show a significant change in FOTO patient-reported outcome tool to demonstrate subjective improvement       Start:  05/02/25    Expected End:  06/27/25            Patient stated goal: Decrease pain in L shoulder to be able to perform all ADLs pain free        Start:  05/02/25    Expected End:  06/27/25            Patient will demonstrate independence in home program for support of progression       Start:  05/02/25    Expected End:  06/27/25               Hand and arm use       Patient will reach overhead and behind back with no shoulder pain        Start:  05/02/25    Expected End:  06/27/25               Lifting & carrying objects       Patient will lift all objects for ADLs with no L shoulder pain        Start:  05/02/25    Expected End:  06/27/25               Pain       Patient will report pain of 0/10 demonstrating a reduction of overall pain       Start:  05/02/25    Expected End:  06/27/25               Range of Motion       Patient will achieve left shoulder flexion of 175 degrees       Start:  05/02/25    Expected End:  06/27/25            Patient will achieve left shoulder internal rotation of contralateral inferior angle degrees in neutral       Start:  05/02/25    Expected End:  06/27/25                Strength       Patient will achieve left shoulder flexion strength of 5/5       Start:  05/02/25    Expected End:  06/27/25            Patient will achieve left shoulder abduction strength of 5/5       Start:  05/02/25    Expected End:  06/27/25            Patient will achieve left shoulder external rotation strength of 5/5 in neutral       Start:  05/02/25    Expected End:  06/27/25                Janie Noel PT, MPT  5/6/2025

## 2025-05-13 ENCOUNTER — TELEPHONE (OUTPATIENT)
Facility: HOSPITAL | Age: 30
End: 2025-05-13
Payer: MEDICAID

## 2025-05-14 ENCOUNTER — CLINICAL SUPPORT (OUTPATIENT)
Facility: HOSPITAL | Age: 30
End: 2025-05-14
Payer: MEDICAID

## 2025-05-14 DIAGNOSIS — M25.50 CHRONIC PAIN OF MULTIPLE JOINTS: Primary | ICD-10-CM

## 2025-05-14 DIAGNOSIS — G89.29 CHRONIC PAIN OF MULTIPLE JOINTS: Primary | ICD-10-CM

## 2025-05-14 PROCEDURE — 97110 THERAPEUTIC EXERCISES: CPT
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
## 2025-05-15 ENCOUNTER — CLINICAL SUPPORT (OUTPATIENT)
Facility: HOSPITAL | Age: 30
End: 2025-05-15
Payer: MEDICAID

## 2025-05-15 DIAGNOSIS — G89.29 CHRONIC PAIN OF MULTIPLE JOINTS: Primary | ICD-10-CM

## 2025-05-15 DIAGNOSIS — M62.512 MUSCLE WASTING AND ATROPHY, NOT ELSEWHERE CLASSIFIED, LEFT SHOULDER: ICD-10-CM

## 2025-05-15 DIAGNOSIS — M25.50 CHRONIC PAIN OF MULTIPLE JOINTS: Primary | ICD-10-CM

## 2025-05-15 PROCEDURE — 97110 THERAPEUTIC EXERCISES: CPT

## 2025-05-15 PROCEDURE — 97140 MANUAL THERAPY 1/> REGIONS: CPT

## 2025-05-15 NOTE — PROGRESS NOTES
"  Outpatient Rehab    Physical Therapy Visit    Patient Name: Alexx Aguilar  MRN: [id number]  YOB: 1995  Encounter Date: 5/15/2025    Therapy Diagnosis:   Encounter Diagnoses   Name Primary?    Chronic pain of multiple joints Yes    Muscle wasting and atrophy, not elsewhere classified, left shoulder      Physician: Khalida Wesley NP    Physician Orders: Eval and Treat  Medical Diagnosis: Chronic pain of multiple joints    Visit # / Visits Authorized:  3 / 20  Insurance Authorization Period: 5/3/2025 to 7/3/2025  Date of Evaluation: 5/14/2025  Plan of Care Certification:       PT/PTA:     Number of PTA visits since last PT visit:   Time In: 1600   Time Out: 1645  Total Time (in minutes): 45   Total Billable Time (in minutes):      FOTO:  Intake Score:  %  Survey Score 2:  %  Survey Score 3:  %    Precautions:       Subjective   Patient reports pain that is "all over." Patient reports pain in "hips, back, and shoulders.".         Objective            Treatment:  Therapeutic Exercise  TE 1: supine clams with RTB x10  TE 2: supine bridges x10  TE 3: prone hip extension x10  TE 4: Bent knee fall outs  TE 5: adductor squeezes x10  TE 6: Bike 10 minutes  Manual Therapy  MT 1: Prone UPA bilaterally L1-L5 grade II-III (pain)  MT 2: ART Glute med/min  MT 3: GT PA Mobs grade III      Time Entry(in minutes):  Manual Therapy Time Entry: 15  Therapeutic Exercise Time Entry: 30    Assessment & Plan   Assessment: Patient is able to tolerate treatment this visit, but he is limited secondary to pain and fatigue. He is reporting significant pain with TTP in lumbar spine, but improved joint mobility and reports of pain following manual therapy. Patient is limited by fatigue with ther-ex this visit.  Evaluation/Treatment Tolerance: Patient limited by pain    Patient will continue to benefit from skilled outpatient physical therapy to address the deficits listed in the problem list box on initial evaluation, provide " "pt/family education and to maximize pt's level of independence in the home and community environment.     Patient's spiritual, cultural, and educational needs considered and patient agreeable to plan of care and goals.           Plan: Planned therapy interventions include: Therapeutic exercise, Therapeutic activities, Neuromuscular re-education, Manual therapy, and ADLs/IADLs.    Planned modalities to include: Electrical stimulation - attended, Electrical stimulation - passive/unattended, Thermotherapy (hot pack), and Cryotherapy (cold pack).         Visit Frequency: 2 times Per Week for 8 Weeks.  Certification dates: 5/2/2025-6/27/2025 (6 weeks remainder of current POC)    Goals:   Active       Physical Therapy       STG 3 weeks:   1. Patient demonstrates independence with home exercise program and progression.  2. Patient demonstrates postural awareness with all activities.   3. Patient reports no falls with functional activities.     LTG 6 weeks:  1. Patient demonstrates improvement in FOTO score 80 or greater to demonstrate improvement in functional activities tolerance.   2. Patient demonstrates improvement in LE strength by 1/2 grade or greater.   3. Patient demonstrates gait with minimal to no gait deviations.   4. Patient demonstrates ability to ascend/descend 8" step with minimal to no deviations.   5. Patient demonstrates improvement in hamstring and piriformis flexibility (bilateral).   6. Patient demonstrates decreased frequency, intensity and duration of pain symptoms.   7. Goals PRN.       Physical Therapy Goal (Progressing)       Start:  05/14/25    Expected End:  06/27/25       STG 3 weeks:   1. Patient demonstrates independence with home exercise program and progression.  2. Patient demonstrates postural awareness with all activities.   3. Patient reports no falls with functional activities.     LTG 6 weeks:  1. Patient demonstrates improvement in FOTO score 80 or greater to demonstrate improvement " "in functional activities tolerance.   2. Patient demonstrates improvement in LE strength by 1/2 grade or greater.   3. Patient demonstrates gait with minimal to no gait deviations.   4. Patient demonstrates ability to ascend/descend 8" step with minimal to no deviations.   5. Patient demonstrates improvement in hamstring and piriformis flexibility (bilateral).   6. Patient demonstrates decreased frequency, intensity and duration of pain symptoms.   7. Goals PRN.              Cj Joiner, PT, DPT, MTC  5/15/2025      "

## 2025-05-19 ENCOUNTER — OFFICE VISIT (OUTPATIENT)
Dept: PAIN MEDICINE | Facility: CLINIC | Age: 30
End: 2025-05-19
Payer: MEDICAID

## 2025-05-19 VITALS
DIASTOLIC BLOOD PRESSURE: 80 MMHG | SYSTOLIC BLOOD PRESSURE: 115 MMHG | HEIGHT: 61 IN | OXYGEN SATURATION: 98 % | HEART RATE: 90 BPM | BODY MASS INDEX: 31.91 KG/M2 | WEIGHT: 169 LBS

## 2025-05-19 DIAGNOSIS — G89.4 CHRONIC PAIN SYNDROME: ICD-10-CM

## 2025-05-19 DIAGNOSIS — M79.7 FIBROMYALGIA: ICD-10-CM

## 2025-05-19 DIAGNOSIS — M25.50 CHRONIC PAIN OF MULTIPLE JOINTS: Primary | ICD-10-CM

## 2025-05-19 DIAGNOSIS — R52 GENERALIZED PAIN: ICD-10-CM

## 2025-05-19 DIAGNOSIS — G89.29 CHRONIC PAIN OF MULTIPLE JOINTS: Primary | ICD-10-CM

## 2025-05-19 PROCEDURE — 3008F BODY MASS INDEX DOCD: CPT | Mod: CPTII,,, | Performed by: ANESTHESIOLOGY

## 2025-05-19 PROCEDURE — 1159F MED LIST DOCD IN RCRD: CPT | Mod: CPTII,,, | Performed by: ANESTHESIOLOGY

## 2025-05-19 PROCEDURE — 99999 PR PBB SHADOW E&M-EST. PATIENT-LVL IV: CPT | Mod: PBBFAC,,, | Performed by: ANESTHESIOLOGY

## 2025-05-19 PROCEDURE — 99204 OFFICE O/P NEW MOD 45 MIN: CPT | Mod: S$PBB,,, | Performed by: ANESTHESIOLOGY

## 2025-05-19 PROCEDURE — 3074F SYST BP LT 130 MM HG: CPT | Mod: CPTII,,, | Performed by: ANESTHESIOLOGY

## 2025-05-19 PROCEDURE — 1160F RVW MEDS BY RX/DR IN RCRD: CPT | Mod: CPTII,,, | Performed by: ANESTHESIOLOGY

## 2025-05-19 PROCEDURE — 99214 OFFICE O/P EST MOD 30 MIN: CPT | Mod: PBBFAC | Performed by: ANESTHESIOLOGY

## 2025-05-19 PROCEDURE — 3079F DIAST BP 80-89 MM HG: CPT | Mod: CPTII,,, | Performed by: ANESTHESIOLOGY

## 2025-05-19 RX ORDER — GABAPENTIN 800 MG/1
800 TABLET ORAL 3 TIMES DAILY
Qty: 90 TABLET | Refills: 11 | Status: SHIPPED | OUTPATIENT
Start: 2025-05-19 | End: 2026-05-19

## 2025-05-19 NOTE — PROGRESS NOTES
New Patient Evaluation  [facility]    Jonas Aguilar  : 1995  Date: 2025     CHIEF COMPLAINT:  Hip Pain and Knee Pain    Referring Physician: Khalida Wesley NP  Primary Care Physician: Khalida Wesley NP    HPI:  This is a 30 y.o. adult with a chief complaint of Hip Pain and Knee Pain  . The patient has Past medical history/Past surgical history of  anxiety, depression, ADHD, mixed connective tissue disease, acid reflux, fibromyalgia    Patient was evaluated and referred by Family Medicine for multiple joint pain, generalized pain, fibromyalgia    Diabetic: No    Anticoagulation medications: None    Allergy To Iodine: No    Currently on Antibiotic: No    Pain Disability Index Review:      2025     1:53 PM   Last 3 PDI Scores   Pain Disability Index (PDI) 46     Current Description of Pain Symptoms:    History of Recent Fall or Trauma: Yes 3/2025  Onset: Chronic, started on going   Pain Location: BL hips, knees  Radiates/associated symptoms:  tenderness  Pain is Getting worse over the last 3 months   The pain is continuous.   The pain is described as aching, dull, sharp, throbbing, and tight, tenderness.   Exacerbating factors: Sitting, Standing, Laying, Bending, Touching, Walking, Lifting, and Getting out of bed/chair.   Mitigating factors Adjusting body .   Symptoms interfere with daily activity, sleeping.   The patient feels like symptoms have been worsening.   Patient denies night fever/night sweats, urinary incontinence, bowel incontinence, significant weight loss, significant motor weakness, and loss of sensations.    Pain score:   Current: 6/10  Best: 4/10  Worst: 9/10    Current pain medication:  Cymbalta 80mg, QHS  Gabapentin 300mg, TID   Mobic 7.5mg, PRN    Current Narcotics/Opioid /benzo Medications:  Opioids- None  Benzodiazepines: No    UDS:  NA    PDMP:  Reviewed and consistent with medication use as prescribed.     Previous Chronic Pain Treatment  History:  Six weeks of conservative therapy include: Physical Therapy/HEP/Physician Lead Exercise Program:  Over the past 12 months, Patient has done  2 sessions.  PT response: Mildly Helpful.   Dates of the PT sessions: 05/2025, present.  Is patient actively participating in home exercise program (HEP)/ physician led exercise program in the last 6 months: Yes.    Non-interventional Pain Therapy:  []Chiropractor.   []Acupuncture/Dry needle.  []TENS unit.  []Heat/ICE.  []Back Brace.    Medications previously tried:  NSAIDs: Ibuprofen (Advil/Motrin)  Topical Agent: Yes  TCA/SSRI/SNRI: SNRI: Duloxetine (Cymbalta)   Anti-convulsants: Gabapentin   Muscle Relaxants: Flexeril (Cyclobenzaprine)  Opioids- Hydrocodone with Acetaminophen (Norco).    Interventional Pain Procedures:  N/A    Previous spine/Relevant joint surgery:  N/A  Surgical History:   has a past surgical history that includes Mastectomy; Liposuction; wisdom teeth removal; Colonoscopy (N/A, 10/24/2024); and hysterectomy, total, laparoscopic, with salpingectomy (Bilateral, 1/2/2025).  Medical History:   has a past medical history of Anxiety, Depression, Fibromyalgia, Gender dysphoria, MCTD (mixed connective tissue disease), Papilledema, PONV (postoperative nausea and vomiting), Sciatica, and Vitamin D deficiency disease (06/30/2014).  Family History:  family history includes Cancer in his maternal grandmother; Colon cancer (age of onset: 70) in his father; Hypertension in his mother.  Allergies:  Dilaudid [hydromorphone], Lactose, and Penicillins   Social History/SUBSTANCE ABUSE HISTORY:  Personal history of substance abuse: No   reports that he has never smoked. He has never been exposed to tobacco smoke. He has never used smokeless tobacco. He reports current alcohol use of about 3.0 standard drinks of alcohol per week. He reports current drug use. Frequency: 7.00 times per week. Drug: Marijuana.  LABS:  CBC  Lab Results   Component Value Date    WBC 10.46  "04/10/2025    HGB 15.3 04/10/2025    HCT 45.5 04/10/2025     Coagulation Profile   Lab Results   Component Value Date     04/10/2025     No results found for: "PT", "PTT", "INR"  CMP:  BMP  Lab Results   Component Value Date     04/10/2025    K 4.4 04/10/2025     04/10/2025    CO2 27 04/10/2025    BUN 13 04/10/2025    CREATININE 1.1 04/10/2025    CALCIUM 9.5 04/10/2025    ANIONGAP 11 04/10/2025    EGFRNORACEVR >60 04/10/2025     Lab Results   Component Value Date    ALT 22 04/10/2025    AST 22 04/10/2025    ALKPHOS 87 04/10/2025    BILITOT 0.3 04/10/2025     HGBA1C:  Lab Results   Component Value Date    HGBA1C 5.2 05/31/2024       ROS:    Review of Systems   GENERAL:  No weight loss, malaise or fevers.  HEENT:   No recent changes in vision or hearing  NECK:  Negative for lumps, no difficulty with swallowing.  RESPIRATORY:  Negative for cough, wheezing or shortness of breath, patient denies any recent URI.  CARDIOVASCULAR:  Negative for chest pain or palpitations.  GI:  Negative for abdominal discomfort, blood in stools or black stools or change in bowel habits.  MUSCULOSKELETAL:  See HPI.  SKIN:  Negative for lesions, rash, and itching.  PSYCH:  No mood disorder or recent psychosocial stressors.   HEMATOLOGY/LYMPHOLOGY:  See the blood thinner sectioned in HPI.  NEURO:  See HPI  All other reviewed and negative other than HPI.    PHYSICAL EXAM:  VITALS: /80 (BP Location: Left arm, Patient Position: Sitting)   Pulse 90   Ht 5' 1" (1.549 m)   Wt 76.7 kg (169 lb)   LMP 06/26/2024 (Exact Date)   SpO2 98%   BMI 31.93 kg/m²   Body mass index is 31.93 kg/m².  GENERAL: Well appearing, in no acute distress, alert and oriented x3, answers questions appropriately.   PSYCH: Flat affect.  SKIN: Skin color, texture, turgor normal, no rashes or lesions.  HEAD/FACE:  Normocephalic, atraumatic. Cranial nerves grossly intact.  CV: Regular rate  PULM: No evidence of respiratory difficulty, symmetric chest " rise.  GI:  Soft and non-Distended.    BACK/SIJ/HIP:  Lumbar Spine Exam:       Inspection: No erythema, bruising.       Palpation: (+++) TTP of lumbar paraspinals bilaterally      ROM:  Limited in flexion, extension, lateral bending.     Hip Exam:      Inspection: No gross deformity or apparent leg length discrepancy      Palpation:  + TTP to bilateral greater trochanteric bursas.   Neurologic Exam:     Alert. Speech is fluent and appropriate.     Strength: 4/5 in bilateral hip flexion and knee extension     Sensation:  Grossly intact to light touch in bilateral lower extremities    GAIT: walking with the assistance using cane    DIAGNOSTIC STUDIES AND MEDICAL RECORDS REVIEW:  I have personally reviewed and interpreted relevant radiology reports and reviewed relevant records from other services in the EMR.     Clinical Impression:  This is a pleasant 30 y.o. adult patient with PMH/PSH of anxiety, depression, ADHD, mixed connective tissue disease, acid reflux, fibromyalgia, presenting with fibromyalgia.   We discussed the underlying diagnoses and multiple treatment options including non-opioid medications, interventional procedures, physical therapy, home exercise, core muscle enhancement, and weight loss.  The risks and benefits of each treatment option were discussed and all questions were answered.    Encounter Diagnosis:  Jonas Aguilar is a 30 y.o. adult with the following diagnoses based on history, exam, and imagin. Chronic pain of multiple joints  - Ambulatory referral/consult to Pain Clinic    2. Generalized pain    3. Chronic pain syndrome    4. Fibromyalgia     Treatment Plan:    Diagnostics/Referrals:  rheumatology and neurology referral    Medications:    NSAIDs: Meloxicam (Mobic)  Topical Agent: Yes  TCA/SSRI/SNRI: SNRI: Duloxetine (Cymbalta)   Anti-convulsants:  increase gabapentin to 800 mg 3 times a day  Muscle Relaxants: None  Opioids: None    Interventional Therapy: NA    Physical  Rehabilitation:  patient is currently enrolled in physical therapy    Patient Education:  fibromyalgia symptoms prognosis and  treatment    Follow-up: RTC  three-month    May consider:  healthy back,  high dose gabapentin    I would like to thank Khalida Wesley, NAVJOT for the opportunity to assist in the care of this patient. We had a very nice visit and I look forward to continuing their care. Please let me know if I can be of further assistance.     Zhanna Perales MD  Anesthesiologist  Interventional Pain Medicine  05/19/2025    Disclaimer:  This note was prepared using voice recognition system and is likely to have sound alike errors that may have been overlooked even after proof reading.  Please call me with any questions.

## 2025-05-19 NOTE — PROGRESS NOTES
New Patient Evaluation  [facility]    Jonas Aguilar  : 1995  Date: 2025     CHIEF COMPLAINT:  No chief complaint on file.    Referring Physician: Khalida Wesley NP  Primary Care Physician: Khalida Wesley NP    HPI:  This is a 30 y.o. adult with a chief complaint of No chief complaint on file.  . The patient has Past medical history/Past surgical history of ***    Patient was evaluated and referred by Family Medicine for multiple joint pain    Diabetic: {GAYes/No/NA:10118}    {Anticoagulation medications:42217}    Allergy To Iodine: {GAYes/No/NA:92479}    Currently on Antibiotic: {GAYes/No/NA:83678}    Pain Disability Index Review:       No data to display                Current Description of Pain Symptoms:    History of Recent Fall or Trauma: {GAYes/No/NA:31734}   Onset: Chronic, started ***  Pain Location: ***  Radiates/associated symptoms: ***.   Pain is Getting worse over the last *** months   The pain is {Intermittent/Continuous:24191}.   The pain is described as {Desc; pain character:02242}.   Exacerbating factors: {Causes; Pain:61523}.   Mitigating factors ***.   Symptoms interfere with daily activity, sleeping, and ***.   The patient feels like symptoms have been {IUW:17611}.   Patient {Denies / Reports:35703} {RED FLAGS:83497}.    Pain score:   Current: {PAIN 0-10:35320}/10  Best: {PAIN 0-10:11133}/10  Worst: {PAIN 0-10:12092}/10    Current pain medication:        Current Narcotics/Opioid /benzo Medications:  Opioids- {GAopioid:99594}  Benzodiazepines: {GAYes/No/NA:63531}    UDS:  NA    PDMP:  {:71775}     Previous Chronic Pain Treatment History:  Six weeks of conservative therapy include: Physical Therapy/HEP/Physician Lead Exercise Program:  Over the past 12 months, Patient has done  *** sessions.  PT response: {PT response:83536} Helpful.   Dates of the PT sessions: ***, ***.  Is patient actively participating in home exercise program (HEP)/ physician  "led exercise program in the last 6 months: {GAYes/No/NA:00505}.    Non-interventional Pain Therapy:  []Chiropractor.   []Acupuncture/Dry needle.  []TENS unit.  []Heat/ICE.  []Back Brace.    Medications previously tried:  NSAIDs: {GANSIAD:55119}  Topical Agent: {GAYes/No/NA:27512}  TCA/SSRI/SNRI: {GATCA/SSRI/SNRI:57531}  Anti-convulsants: {GAAnticonvulsants:97895}  Muscle Relaxants: {GAmuscle Relaxant:64586}  Opioids- {GAopioid:03201}.    Interventional Pain Procedures:  ***    Previous spine/Relevant joint surgery:  ***  Surgical History:   has a past surgical history that includes Mastectomy; Liposuction; wisdom teeth removal; Colonoscopy (N/A, 10/24/2024); and hysterectomy, total, laparoscopic, with salpingectomy (Bilateral, 1/2/2025).  Medical History:   has a past medical history of Anxiety, Depression, Fibromyalgia, Gender dysphoria, MCTD (mixed connective tissue disease), Papilledema, PONV (postoperative nausea and vomiting), Sciatica, and Vitamin D deficiency disease (06/30/2014).  Family History:  family history includes Cancer in his maternal grandmother; Colon cancer (age of onset: 70) in his father; Hypertension in his mother.  Allergies:  Dilaudid [hydromorphone], Lactose, and Penicillins   Social History/SUBSTANCE ABUSE HISTORY:  Personal history of substance abuse: No   reports that he has never smoked. He has never been exposed to tobacco smoke. He has never used smokeless tobacco. He reports current alcohol use of about 3.0 standard drinks of alcohol per week. He reports current drug use. Frequency: 7.00 times per week. Drug: Marijuana.  LABS:  CBC  Lab Results   Component Value Date    WBC 10.46 04/10/2025    HGB 15.3 04/10/2025    HCT 45.5 04/10/2025     Coagulation Profile   Lab Results   Component Value Date     04/10/2025     No results found for: "PT", "PTT", "INR"  CMP:  BMP  Lab Results   Component Value Date     04/10/2025    K 4.4 04/10/2025     04/10/2025    CO2 27 " 04/10/2025    BUN 13 04/10/2025    CREATININE 1.1 04/10/2025    CALCIUM 9.5 04/10/2025    ANIONGAP 11 04/10/2025    EGFRNORACEVR >60 04/10/2025     Lab Results   Component Value Date    ALT 22 04/10/2025    AST 22 04/10/2025    ALKPHOS 87 04/10/2025    BILITOT 0.3 04/10/2025     HGBA1C:  Lab Results   Component Value Date    HGBA1C 5.2 05/31/2024       ROS:    Review of Systems   GENERAL:  No weight loss, malaise or fevers.  HEENT:   No recent changes in vision or hearing  NECK:  Negative for lumps, no difficulty with swallowing.  RESPIRATORY:  Negative for cough, wheezing or shortness of breath, patient denies any recent URI.  CARDIOVASCULAR:  Negative for chest pain or palpitations.  GI:  Negative for abdominal discomfort, blood in stools or black stools or change in bowel habits.  MUSCULOSKELETAL:  See HPI.  SKIN:  Negative for lesions, rash, and itching.  PSYCH:  No mood disorder or recent psychosocial stressors.   HEMATOLOGY/LYMPHOLOGY:  See the blood thinner sectioned in HPI.  NEURO:  See HPI  All other reviewed and negative other than HPI.    PHYSICAL EXAM:  VITALS: LMP 06/26/2024 (Exact Date)   There is no height or weight on file to calculate BMI.  GENERAL: Well appearing, in no acute distress, alert and oriented x3, answers questions appropriately.   PSYCH: Flat affect.  SKIN: Skin color, texture, turgor normal, no rashes or lesions.  HEAD/FACE:  Normocephalic, atraumatic. Cranial nerves grossly intact.  CV: Regular rate  PULM: No evidence of respiratory difficulty, symmetric chest rise.  GI:  Soft and non-Distended.  NECK: ({GA+:77574}) pain to palpation over the cervical paraspinous muscles. Spurling:{GA+:39248}. ({GA+:82946}) pain with neck flexion, extension, or lateral flexion, Muscle strength in RT UE ***/5 and Left UE ***/5, Right Hand  ***/5, Left Hand ***/5  BACK/SIJ/HIP:  Lumbar Spine Exam:       Inspection: No erythema, bruising.       Palpation: ({GA+:94890}) TTP of lumbar paraspinals  bilaterally      ROM:  Limited in flexion, extension, lateral bending.       ({GA+:91915}) Facet loading {GAHip:78311}      ({GA+:80126}) Straight Leg Raise, {GAHip:60919}      ({GA+:81903}) LORY, Tenderness over the PSIS, Yeoman test, {GAHip:68783}  Hip Exam:      Inspection: No gross deformity or apparent leg length discrepancy      Palpation:  No TTP to bilateral greater trochanteric bursas.       ROM:  *** limitation Due to pain in internal rotation, external rotation b/l  Neurologic Exam:     Alert. Speech is fluent and appropriate.     Strength: ***/5 in {GAHip:98957} hip flexion and knee extension     Sensation:  Grossly intact to light touch in bilateral lower extremities     Tone: No abnormality appreciated in bilateral lower extremities  Knee exam:  No gross deformity or apparent leg length discrepancy, positive tenderness over the anteromedial aspect of the knee cap, positive limitation due to pain in flexion and extension, sensation  grossly intact to light touch in bilateral lower extremity, no atrophy or tone abnormalities    GAIT: {GAgait:04169}    DIAGNOSTIC STUDIES AND MEDICAL RECORDS REVIEW:  I have personally reviewed and interpreted relevant radiology reports and reviewed relevant records from other services in the EMR.   ***  Clinical Impression:  This is a pleasant 30 y.o. adult patient with PMH/PSH of ***, presenting with***.     We discussed the underlying diagnoses and multiple treatment options including non-opioid medications, interventional procedures, physical therapy, home exercise, core muscle enhancement, and weight loss.  The risks and benefits of each treatment option were discussed and all questions were answered.      Encounter Diagnosis:  Jonas Aguilar is a 30 y.o. adult with the following diagnoses based on history, exam, and imaging:  There are no diagnoses linked to this encounter.     Treatment Plan:    Diagnostics/Referrals: {gaimage:04556}    Medications:    NSAIDs:  "{GANSIAD:27116}  Topical Agent: {GAYes/No/NA:49647}  TCA/SSRI/SNRI: {GATCA/SSRI/SNRI:62367}  Anti-convulsants: {GAAnticonvulsants:38031}  Muscle Relaxants: {GAmuscle Relaxant:96737}  Opioids: {GAopioid:08549::"None"}  Patient was educated about the risk and benefit of chronic opioid therapy including dependency, addiction, diversion, and opioid hyperalgesia.  Interventional Therapy: {GAProcedure:17316}.  Sedation: {GAsedation:28872}.  {Anticoagulation medications:14279} -Clearance to stop Blood thinner: {GAYes/No/NA:60188}    Regarding the above interventions, the patient has been educated regarding the risks (including bleeding, infection, increased pain, nerve damage, or allergic reaction), benefits, and alternatives. The patient states he understands and is eager to proceed.    Physical Rehabilitation: {GAPT:25514}    Patient Education: Counseled patient regarding the importance of {:11276}, I have stressed the importance of physical activity and a home exercise plan to help with pain and improve health.    Follow-up: RTC ***.    May consider:     I would like to thank Khalida Wesley NP for the opportunity to assist in the care of this patient. We had a very nice visit and I look forward to continuing their care. Please let me know if I can be of further assistance.     Zhanna Perales MD  Anesthesiologist  Interventional Pain Medicine  05/19/2025    Disclaimer:  This note was prepared using voice recognition system and is likely to have sound alike errors that may have been overlooked even after proof reading.  Please call me with any questions.    "

## 2025-05-20 ENCOUNTER — TELEPHONE (OUTPATIENT)
Dept: ORTHOPEDICS | Facility: CLINIC | Age: 30
End: 2025-05-20

## 2025-05-20 ENCOUNTER — OFFICE VISIT (OUTPATIENT)
Dept: ORTHOPEDICS | Facility: CLINIC | Age: 30
End: 2025-05-20
Payer: MEDICAID

## 2025-05-20 DIAGNOSIS — M75.122 NONTRAUMATIC COMPLETE TEAR OF LEFT ROTATOR CUFF: ICD-10-CM

## 2025-05-20 DIAGNOSIS — G89.29 CHRONIC LEFT SHOULDER PAIN: ICD-10-CM

## 2025-05-20 DIAGNOSIS — M25.512 CHRONIC LEFT SHOULDER PAIN: ICD-10-CM

## 2025-05-20 DIAGNOSIS — M75.102 TEAR OF LEFT SUPRASPINATUS TENDON: Primary | ICD-10-CM

## 2025-05-20 PROCEDURE — 1159F MED LIST DOCD IN RCRD: CPT | Mod: CPTII,,,

## 2025-05-20 PROCEDURE — 1160F RVW MEDS BY RX/DR IN RCRD: CPT | Mod: CPTII,,,

## 2025-05-20 PROCEDURE — 99214 OFFICE O/P EST MOD 30 MIN: CPT | Mod: S$PBB,,,

## 2025-05-20 PROCEDURE — 99999 PR PBB SHADOW E&M-EST. PATIENT-LVL IV: CPT | Mod: PBBFAC,,,

## 2025-05-20 PROCEDURE — 99214 OFFICE O/P EST MOD 30 MIN: CPT | Mod: PBBFAC,PN

## 2025-05-20 RX ORDER — MUPIROCIN 20 MG/G
OINTMENT TOPICAL
Status: CANCELLED | OUTPATIENT
Start: 2025-05-20

## 2025-05-20 RX ORDER — SODIUM CHLORIDE 9 MG/ML
INJECTION, SOLUTION INTRAVENOUS CONTINUOUS
Status: CANCELLED | OUTPATIENT
Start: 2025-05-20

## 2025-05-20 RX ORDER — MUPIROCIN 20 MG/G
OINTMENT TOPICAL
OUTPATIENT
Start: 2025-05-20

## 2025-05-20 NOTE — TELEPHONE ENCOUNTER
Subjective:      Patient ID: Jonas Aguilar is a 30 y.o. adult.    Chief Complaint:  Left shoulder pain      HPI: Jonas Aguilar is a 30 y.o. right hand dominant adult who presents to clinic for intermittent left shoulder pain. The patient denies known EVELIN.  The pain started 7 months ago and is becoming progressively worse.  Pain is located over (points to) superior shoulder . He reports that the pain is a 3 /10 sharp and tight pain today. Pain is 8/10 at its worst.  The pain is aggravated by elevation and lifting.  Associated symptoms include weakness. Denies numbness, tingling, radiation. The pain is affecting ADLs and limiting desired level of activity. There is not a history of previous surgery to the shoulder.      Previous treatments include physical therapy which has provided some relief.  Patient states he is taking gabapentin, Cymbalta, and meloxicam for chronic back pain.    Occupation: currently unemployed    Ambulating: with a cane  Diabetic:  No  Smoking:  currently smokes marijuana (medical necessity)   History of DVT/PE: Negative    PAST MEDICAL HISTORY:    Past Medical History:   Diagnosis Date    Anxiety     Depression     Fibromyalgia     Gender dysphoria     MCTD (mixed connective tissue disease)     Papilledema     PONV (postoperative nausea and vomiting)     Sciatica     Vitamin D deficiency disease 06/30/2014     PAST SURGICAL HISTORY:    Past Surgical History:   Procedure Laterality Date    COLONOSCOPY N/A 10/24/2024    Procedure: COLONOSCOPY;  Surgeon: Terry Brennan MD;  Location: [facility];  Service: Endoscopy;  Laterality: N/A;    HYSTERECTOMY, TOTAL, LAPAROSCOPIC, WITH SALPINGECTOMY Bilateral 1/2/2025    Procedure: HYSTERECTOMY,TOTAL,LAPAROSCOPIC,WITH SALPINGECTOMY;  Surgeon: Corrine Hernandez MD;  Location: [facility];  Service: OB/GYN;  Laterality: Bilateral;  2nd 0630    LIPOSUCTION      MASTECTOMY      wisdom teeth removal       FAMILY HISTORY:    Family History   Problem  Relation Name Age of Onset    Hypertension Mother      Colon cancer Father  70    Cancer Maternal Grandmother          breast     SOCIAL HISTORY:    Social History     Occupational History    Occupation: Maintenance     Employer: [employer]   Tobacco Use    Smoking status: Never     Passive exposure: Never    Smokeless tobacco: Never   Substance and Sexual Activity    Alcohol use: Yes     Alcohol/week: 3.0 standard drinks of alcohol     Types: 3 Standard drinks or equivalent per week     Comment: occ    Drug use: Yes     Frequency: 7.0 times per week     Types: Marijuana     Comment: RX THC-used 12/29/2024-use daily    Sexual activity: Yes     Partners: Male        MEDICATIONS: Current Medications[1]  ALLERGIES:   Review of patient's allergies indicates:   Allergen Reactions    Dilaudid [hydromorphone]     Lactose Diarrhea    Penicillins        Review of Systems:  Constitution: Negative for chills, fever and night sweats.   HENT: Negative for congestion and headaches.    Eyes: Negative for blurred vision or vision loss.  Cardiovascular: Negative for chest pain and syncope.   Respiratory: Negative for cough and shortness of breath.    Endocrine: Negative for polydipsia, polyphagia and polyuria.   Hematologic/Lymphatic: Negative for bleeding problem. Does not bruise/bleed easily.   Skin: Negative for dry skin, itching and rash.   Musculoskeletal: See HPI.   Gastrointestinal: Negative for abdominal pain and bowel incontinence.   Genitourinary: Negative for bladder incontinence and nocturia.   Neurological: Negative for disturbances in coordination, loss of balance and seizures.   Psychiatric/Behavioral: Negative for depression. The patient does not have insomnia.    Allergic/Immunologic: Negative for hives and persistent infections.          Objective:        There were no vitals filed for this visit.    PHYSICAL EXAM:  General: Alert & oriented x3, well-developed and well-nourished, in no acute distress, sitting  comfortably in the exam room.  Skin: Warm and dry. Capillary refill less than 2 seconds.   Head: Normocephalic and atraumatic.   Eyes: Sclera appear normal.   Nose: No deformities seen.   Ears: No deformities seen.   Neck: No tracheal deviation present.   Pulmonary/Chest: Breathing unlabored.   Neurological: Alert and oriented to person, place, and time.   Psychiatric: Mood is pleasant and affect appropriate.     LEFT SHOULDER        Inspection/Observation:   No evidence of swelling, redness, scars, visible deformity, or atrophy.         Palpation:    No tenderness at the SC or AC joint  No tenderness over the clavicle   No tenderness over biceps tendon or bicipital groove  No tenderness over subacromial space   Range of Motion:    Active Forward Flexion:           120 °       Active Abduction:                     170 °     Active Internal Rotation:          to T10               Active External Rotation:         40 °             Special Tests:  Empty can test  Positive  Full can test   Positive  Resisted internal rotation Positive  Resisted external rotation Positive  Neer's test   Positive  Pedro'-Huog test Positive        Strength Testing:  Forward Flexion  4/5  Abduction   4/5  Internal Rotation  5/5  External Rotation  5/5        Neurovascular Exam Bilateral UEs:  Sensation intact to light touch in the distal median, radial, and ulnar nerve distributions bilaterally.  Capillary refill intact <2 seconds in all digits bilaterally    Imaging:   X-Rays: 3 views of left shoulder dated 11/04/2024, and independently reviewed, show: No acute fractures or dislocations. Joint spaces are preserved. No evidence of foreign bodies.     MRI: left shoulder dated 04/02/2025, reviewed, show: 1. Near complete supraspinatus tendon tear with subacromial-subdeltoid effusion 2. Diffuse abnormal bone marrow signal intensity suggests red marrow conversion or marrow replacement process        Assessment:       No diagnosis  found.        Plan:              I explained the nature of the problem to the patient.     I discussed at length with the patient all the different treatment options available for his left shoulder including anti-inflammatories, acetaminophen, rest, ice, heat, physical therapy, and corticosteroid injections. I explained the potential role of surgery in the treatment of this condition.     Patient would like to proceed with left shoulder arthroscopy with rotator cuff repair as an outpatient surgery. The risks and benefits of surgery were discussed with the patient today and he verbalized understand. The patient was offered the option of an additional visit with Dr. Gautam for an opportunity for further discussion and questions prior to the procedure. The patient declined an additional appointment. Patient was informed that Dr. Gautam will be present in pre-operative holding prior to surgery in order to obtain informed consent from the patient and that will provide an additional opportunity for patient to discuss any potential questions with Dr. Gautam. Surgical consents were signed. Orders for surgery were entered. Surgery is scheduled for 07/09/2025.      Medications:  Continue OTC Tylenol and NSAIDs as needed for pain management.  Physical Therapy:  Continue previous referral to physical therapy for shoulder ROM, stretching, strengthening, and conditioning.  Pain Management: Ice compress to the affected area 2-3x a day for 15-20 minutes as needed for pain management.      All of the patient's questions were answered and the patient will contact us if they have any questions or concerns in the interim.      Antonia Henriquez PA-C  [facility]  Orthopedic Surgery    Medical Dictation software was used during the dictation of portions or the entirety of this medical record.  Phonetic or grammatic errors may exist due to the use of this software. For clarification, refer to the author of the document.             "    [1]   Current Outpatient Medications:     albuterol (PROVENTIL/VENTOLIN HFA) 90 mcg/actuation inhaler, 2 puffs every 6 (six) hours as needed., Disp: , Rfl:     atomoxetine (STRATTERA) 80 MG capsule, Take 80 mg by mouth every morning., Disp: , Rfl:     clindamycin (CLEOCIN T) 1 % lotion, Apply topically., Disp: , Rfl:     DULoxetine 40 mg CpDR, Take 2 capsules by mouth., Disp: , Rfl:     finasteride (PROSCAR) 5 mg tablet, Take 5 mg by mouth once daily., Disp: , Rfl:     gabapentin (NEURONTIN) 800 MG tablet, Take 1 tablet (800 mg total) by mouth 3 (three) times daily., Disp: 90 tablet, Rfl: 11    linaCLOtide (LINZESS) 72 mcg Cap capsule, Take 1 capsule (72 mcg total) by mouth before breakfast., Disp: 30 each, Rfl: 11    minoxidiL (LONITEN) 2.5 MG tablet, Take 2.5 mg by mouth every evening., Disp: , Rfl:     needle, disp, 18 G 18 gauge x 1" Ndle, BD Regular Bevel Needles 18 gauge x 1"  USE 1 EACH EVERY 7 DAYS TO DRAW UP TESTOTERONE, Disp: , Rfl:     syringe with needle 3 mL 21 gauge x 1 1/2" Syrg, BD Luer-Edmar Syringe 3 mL 21 gauge x 1 1/2"  USE 1 SYRINGE FOR TESTOSTERONE INJ EVERY 7 DAYS.(SYRINGE SIZE DIFFERENT/MD), Disp: , Rfl:     testosterone cypionate (DEPOTESTOTERONE CYPIONATE) 200 mg/mL injection, SMARTSI.4 Milliliter(s) IM Once a Week, Disp: , Rfl:     UNKNOWN TO PATIENT, PKG 3.5G VERONICA DANG, Disp: , Rfl:     "

## 2025-05-20 NOTE — PROGRESS NOTES
Subjective:      Patient ID: Jonas Aguilar is a 30 y.o. adult.    Chief Complaint:  Left shoulder pain      HPI: Jonas Aguilar is a 30 y.o. right hand dominant adult who presents to clinic for intermittent left shoulder pain. The patient denies known EVELIN.  The pain started 7 months ago and is becoming progressively worse.  Pain is located over (points to) superior shoulder . He reports that the pain is a 3 /10 sharp and tight pain today. Pain is 8/10 at its worst.  The pain is aggravated by elevation and lifting.  Associated symptoms include weakness. Denies numbness, tingling, radiation. The pain is affecting ADLs and limiting desired level of activity. There is not a history of previous surgery to the shoulder.      Previous treatments include physical therapy which has provided some relief.  Patient states he is taking gabapentin, Cymbalta, and meloxicam for chronic back pain.    Occupation: currently unemployed    Ambulating: with a cane  Diabetic:  No  Smoking:  currently smokes marijuana (medical necessity)   History of DVT/PE: Negative    PAST MEDICAL HISTORY:    Past Medical History:   Diagnosis Date    Anxiety     Depression     Fibromyalgia     Gender dysphoria     MCTD (mixed connective tissue disease)     Papilledema     PONV (postoperative nausea and vomiting)     Sciatica     Vitamin D deficiency disease 06/30/2014     PAST SURGICAL HISTORY:    Past Surgical History:   Procedure Laterality Date    COLONOSCOPY N/A 10/24/2024    Procedure: COLONOSCOPY;  Surgeon: Terry Brennan MD;  Location: [facility];  Service: Endoscopy;  Laterality: N/A;    HYSTERECTOMY, TOTAL, LAPAROSCOPIC, WITH SALPINGECTOMY Bilateral 1/2/2025    Procedure: HYSTERECTOMY,TOTAL,LAPAROSCOPIC,WITH SALPINGECTOMY;  Surgeon: Corrine Hernandez MD;  Location: [facility];  Service: OB/GYN;  Laterality: Bilateral;  2nd 0630    LIPOSUCTION      MASTECTOMY      wisdom teeth removal       FAMILY HISTORY:    Family History   Problem  Relation Name Age of Onset    Hypertension Mother      Colon cancer Father  70    Cancer Maternal Grandmother          breast     SOCIAL HISTORY:    Social History     Occupational History    Occupation: Maintenance     Employer: [email or web address]   Tobacco Use    Smoking status: Never     Passive exposure: Never    Smokeless tobacco: Never   Substance and Sexual Activity    Alcohol use: Yes     Alcohol/week: 3.0 standard drinks of alcohol     Types: 3 Standard drinks or equivalent per week     Comment: occ    Drug use: Yes     Frequency: 7.0 times per week     Types: Marijuana     Comment: RX THC-used 12/29/2024-use daily    Sexual activity: Yes     Partners: Male        MEDICATIONS: Current Medications[1]  ALLERGIES:   Review of patient's allergies indicates:   Allergen Reactions    Dilaudid [hydromorphone]     Lactose Diarrhea    Penicillins        Review of Systems:  Constitution: Negative for chills, fever and night sweats.   HENT: Negative for congestion and headaches.    Eyes: Negative for blurred vision or vision loss.  Cardiovascular: Negative for chest pain and syncope.   Respiratory: Negative for cough and shortness of breath.    Endocrine: Negative for polydipsia, polyphagia and polyuria.   Hematologic/Lymphatic: Negative for bleeding problem. Does not bruise/bleed easily.   Skin: Negative for dry skin, itching and rash.   Musculoskeletal: See HPI.   Gastrointestinal: Negative for abdominal pain and bowel incontinence.   Genitourinary: Negative for bladder incontinence and nocturia.   Neurological: Negative for disturbances in coordination, loss of balance and seizures.   Psychiatric/Behavioral: Negative for depression. The patient does not have insomnia.    Allergic/Immunologic: Negative for hives and persistent infections.          Objective:        There were no vitals filed for this visit.    PHYSICAL EXAM:  General: Alert & oriented x3, well-developed and well-nourished, in no acute distress, sitting  comfortably in the exam room.  Skin: Warm and dry. Capillary refill less than 2 seconds.   Head: Normocephalic and atraumatic.   Eyes: Sclera appear normal.   Nose: No deformities seen.   Ears: No deformities seen.   Neck: No tracheal deviation present.   Pulmonary/Chest: Breathing unlabored.   Neurological: Alert and oriented to person, place, and time.   Psychiatric: Mood is pleasant and affect appropriate.     LEFT SHOULDER        Inspection/Observation:   No evidence of swelling, redness, scars, visible deformity, or atrophy.         Palpation:    No tenderness at the SC or AC joint  No tenderness over the clavicle   No tenderness over biceps tendon or bicipital groove  No tenderness over subacromial space   Range of Motion:    Active Forward Flexion:           120 °       Active Abduction:                     170 °     Active Internal Rotation:          to T10               Active External Rotation:         40 °             Special Tests:  Empty can test  Positive  Full can test   Positive  Resisted internal rotation Positive  Resisted external rotation Positive  Neer's test   Positive  Pedro'-Hugo test Positive        Strength Testing:  Forward Flexion  4/5  Abduction   4/5  Internal Rotation  5/5  External Rotation  5/5        Neurovascular Exam Bilateral UEs:  Sensation intact to light touch in the distal median, radial, and ulnar nerve distributions bilaterally.  Capillary refill intact <2 seconds in all digits bilaterally    Imaging:   X-Rays: 3 views of left shoulder dated 11/04/2024, and independently reviewed, show: No acute fractures or dislocations. Joint spaces are preserved. No evidence of foreign bodies.     MRI: left shoulder dated 04/02/2025, reviewed, show: 1. Near complete supraspinatus tendon tear with subacromial-subdeltoid effusion 2. Diffuse abnormal bone marrow signal intensity suggests red marrow conversion or marrow replacement process        Assessment:       1. Tear of left  supraspinatus tendon    2. Chronic left shoulder pain    3. Nontraumatic complete tear of left rotator cuff          Plan:       Orders Placed This Encounter    Case Request Operating Room: ARTHROSCOPY, SHOULDER, W/ ROTATOR CUFF REPAIR       I explained the nature of the problem to the patient.     I discussed at length with the patient all the different treatment options available for his left shoulder including anti-inflammatories, acetaminophen, rest, ice, heat, physical therapy, and corticosteroid injections. I explained the potential role of surgery in the treatment of this condition.     Patient would like to proceed with left shoulder arthroscopy with rotator cuff repair as an outpatient surgery. The risks and benefits of surgery were discussed with the patient today and he verbalized understand. The patient was offered the option of an additional visit with Dr. Gautam for an opportunity for further discussion and questions prior to the procedure. The patient declined an additional appointment. Patient was informed that Dr. Gautam will be present in pre-operative holding prior to surgery in order to obtain informed consent from the patient and that will provide an additional opportunity for patient to discuss any potential questions with Dr. Gautam. Surgical consents were signed. Orders for surgery were entered. Surgery is scheduled for 07/09/2025.      Medications:  Continue OTC Tylenol and NSAIDs as needed for pain management.  Physical Therapy:  Continue previous referral to physical therapy for shoulder ROM, stretching, strengthening, and conditioning.  Pain Management: Ice compress to the affected area 2-3x a day for 15-20 minutes as needed for pain management.      All of the patient's questions were answered and the patient will contact us if they have any questions or concerns in the interim.      Antonia Henriquez PA-C  [facility]  Orthopedic Surgery    Medical Dictation software was used during  "the dictation of portions or the entirety of this medical record.  Phonetic or grammatic errors may exist due to the use of this software. For clarification, refer to the author of the document.                [1]   Current Outpatient Medications:     albuterol (PROVENTIL/VENTOLIN HFA) 90 mcg/actuation inhaler, 2 puffs every 6 (six) hours as needed., Disp: , Rfl:     atomoxetine (STRATTERA) 80 MG capsule, Take 80 mg by mouth every morning., Disp: , Rfl:     clindamycin (CLEOCIN T) 1 % lotion, Apply topically., Disp: , Rfl:     DULoxetine 40 mg CpDR, Take 2 capsules by mouth., Disp: , Rfl:     finasteride (PROSCAR) 5 mg tablet, Take 5 mg by mouth once daily., Disp: , Rfl:     gabapentin (NEURONTIN) 800 MG tablet, Take 1 tablet (800 mg total) by mouth 3 (three) times daily., Disp: 90 tablet, Rfl: 11    linaCLOtide (LINZESS) 72 mcg Cap capsule, Take 1 capsule (72 mcg total) by mouth before breakfast., Disp: 30 each, Rfl: 11    minoxidiL (LONITEN) 2.5 MG tablet, Take 2.5 mg by mouth every evening., Disp: , Rfl:     needle, disp, 18 G 18 gauge x 1" Ndle, BD Regular Bevel Needles 18 gauge x 1"  USE 1 EACH EVERY 7 DAYS TO DRAW UP TESTOTERONE, Disp: , Rfl:     syringe with needle 3 mL 21 gauge x 1 1/2" Syrg, BD Luer-Edmar Syringe 3 mL 21 gauge x 1 1/2"  USE 1 SYRINGE FOR TESTOSTERONE INJ EVERY 7 DAYS.(SYRINGE SIZE DIFFERENT/MD), Disp: , Rfl:     testosterone cypionate (DEPOTESTOTERONE CYPIONATE) 200 mg/mL injection, SMARTSI.4 Milliliter(s) IM Once a Week, Disp: , Rfl:     UNKNOWN TO PATIENT, PKG 3.5G VERONICA DANG, Disp: , Rfl:     "

## 2025-05-26 ENCOUNTER — TELEPHONE (OUTPATIENT)
Facility: HOSPITAL | Age: 30
End: 2025-05-26
Payer: MEDICAID

## 2025-05-29 ENCOUNTER — CLINICAL SUPPORT (OUTPATIENT)
Facility: HOSPITAL | Age: 30
End: 2025-05-29
Payer: MEDICAID

## 2025-05-29 DIAGNOSIS — G89.29 CHRONIC PAIN OF MULTIPLE JOINTS: Primary | ICD-10-CM

## 2025-05-29 DIAGNOSIS — M62.512 MUSCLE WASTING AND ATROPHY, NOT ELSEWHERE CLASSIFIED, LEFT SHOULDER: ICD-10-CM

## 2025-05-29 DIAGNOSIS — M25.612 STIFFNESS OF LEFT SHOULDER JOINT: ICD-10-CM

## 2025-05-29 DIAGNOSIS — M25.50 CHRONIC PAIN OF MULTIPLE JOINTS: Primary | ICD-10-CM

## 2025-05-29 PROCEDURE — 97110 THERAPEUTIC EXERCISES: CPT

## 2025-05-29 NOTE — PROGRESS NOTES
"  Outpatient Rehab    Physical Therapy Visit    Patient Name: Alexx Aguilar  MRN: [id number]  YOB: 1995  Encounter Date: 5/29/2025    Therapy Diagnosis:   Encounter Diagnoses   Name Primary?    Chronic pain of multiple joints Yes    Muscle wasting and atrophy, not elsewhere classified, left shoulder     Stiffness of left shoulder joint      Physician: Khalida Wesley NP    Physician Orders: Eval and Treat  Medical Diagnosis: Chronic pain of multiple joints    Visit # / Visits Authorized:  4 / 20  Insurance Authorization Period: 5/3/2025 to 7/3/2025  Date of Evaluation: 5/14/2025  Plan of Care Certification:       PT/PTA:     Number of PTA visits since last PT visit:   Time In: 1450   Time Out: 1530  Total Time (in minutes): 40   Total Billable Time (in minutes):      FOTO:  Intake Score:  %  Survey Score 2:  %  Survey Score 3:  %    Precautions:       Subjective   Patient continues to report signifciant aching pain in all of his joints. This pain is fairly consistent and reports the pain has been "bad this week.".         Objective            Treatment:  Therapeutic Exercise  TE 1: Stanbding Straight Arm Pulldowns Ancore  TE 2: Scapular Retractions Ancore  TE 3: ScitFit      Time Entry(in minutes):  Therapeutic Exercise Time Entry: 40    Assessment & Plan   Assessment: Patient is able to perform a limited treatment session this visit. He reports fatigue and pain in his joints leading to tolerance issues. He is demonstrating some nausea as well. Patient requires cuing for proper core activation and proper form throughout treatment this visit  Evaluation/Treatment Tolerance: Patient limited by fatigue    The patient will continue to benefit from skilled outpatient physical therapy in order to address the deficits listed in the problem list on the initial evaluation, provide patient and family education, and maximize the patients level of independence in the home and community environments.     The " "patient's spiritual, cultural, and educational needs were considered, and the patient is agreeable to the plan of care and goals.     Education  Education was done with Patient. The patient's learning style includes Demonstration, Listening, and Pictures/video. The patient Demonstrates understanding and Verbalizes understanding.         Educated in home exercise program.       Plan: Planned therapy interventions include: Therapeutic exercise, Therapeutic activities, Neuromuscular re-education, Manual therapy, and ADLs/IADLs.    Planned modalities to include: Electrical stimulation - attended, Electrical stimulation - passive/unattended, Thermotherapy (hot pack), and Cryotherapy (cold pack).         Visit Frequency: 2 times Per Week for 8 Weeks.  Certification dates: 5/2/2025-6/27/2025 (6 weeks remainder of current POC)    Goals:   Active       Physical Therapy       STG 3 weeks:   1. Patient demonstrates independence with home exercise program and progression.  2. Patient demonstrates postural awareness with all activities.   3. Patient reports no falls with functional activities.     LTG 6 weeks:  1. Patient demonstrates improvement in FOTO score 80 or greater to demonstrate improvement in functional activities tolerance.   2. Patient demonstrates improvement in LE strength by 1/2 grade or greater.   3. Patient demonstrates gait with minimal to no gait deviations.   4. Patient demonstrates ability to ascend/descend 8" step with minimal to no deviations.   5. Patient demonstrates improvement in hamstring and piriformis flexibility (bilateral).   6. Patient demonstrates decreased frequency, intensity and duration of pain symptoms.   7. Goals PRN.       Physical Therapy Goal (Progressing)       Start:  05/14/25    Expected End:  06/27/25       STG 3 weeks:   1. Patient demonstrates independence with home exercise program and progression.  2. Patient demonstrates postural awareness with all activities.   3. Patient " "reports no falls with functional activities.     LTG 6 weeks:  1. Patient demonstrates improvement in FOTO score 80 or greater to demonstrate improvement in functional activities tolerance.   2. Patient demonstrates improvement in LE strength by 1/2 grade or greater.   3. Patient demonstrates gait with minimal to no gait deviations.   4. Patient demonstrates ability to ascend/descend 8" step with minimal to no deviations.   5. Patient demonstrates improvement in hamstring and piriformis flexibility (bilateral).   6. Patient demonstrates decreased frequency, intensity and duration of pain symptoms.   7. Goals PRN.              Cj Joiner, PT, DPT, MTC  5/29/2025      "

## 2025-06-03 ENCOUNTER — CLINICAL SUPPORT (OUTPATIENT)
Facility: HOSPITAL | Age: 30
End: 2025-06-03
Payer: MEDICAID

## 2025-06-03 DIAGNOSIS — M25.50 CHRONIC PAIN OF MULTIPLE JOINTS: ICD-10-CM

## 2025-06-03 DIAGNOSIS — M54.30 BACK PAIN WITH SCIATICA: Primary | ICD-10-CM

## 2025-06-03 DIAGNOSIS — G89.29 CHRONIC PAIN OF MULTIPLE JOINTS: ICD-10-CM

## 2025-06-03 DIAGNOSIS — M54.9 BACK PAIN WITH SCIATICA: Primary | ICD-10-CM

## 2025-06-03 DIAGNOSIS — M25.612 STIFFNESS OF LEFT SHOULDER JOINT: ICD-10-CM

## 2025-06-03 PROCEDURE — 97110 THERAPEUTIC EXERCISES: CPT | Mod: CQ

## 2025-06-04 ENCOUNTER — TELEPHONE (OUTPATIENT)
Facility: HOSPITAL | Age: 30
End: 2025-06-04
Payer: MEDICAID

## 2025-06-10 ENCOUNTER — CLINICAL SUPPORT (OUTPATIENT)
Facility: HOSPITAL | Age: 30
End: 2025-06-10
Payer: MEDICAID

## 2025-06-10 DIAGNOSIS — M25.612 STIFFNESS OF LEFT SHOULDER JOINT: Primary | ICD-10-CM

## 2025-06-10 DIAGNOSIS — M25.50 CHRONIC PAIN OF MULTIPLE JOINTS: ICD-10-CM

## 2025-06-10 DIAGNOSIS — G89.29 CHRONIC PAIN OF MULTIPLE JOINTS: ICD-10-CM

## 2025-06-10 DIAGNOSIS — M54.9 BACK PAIN WITH SCIATICA: ICD-10-CM

## 2025-06-10 DIAGNOSIS — M54.30 BACK PAIN WITH SCIATICA: ICD-10-CM

## 2025-06-10 DIAGNOSIS — M62.512 MUSCLE WASTING AND ATROPHY, NOT ELSEWHERE CLASSIFIED, LEFT SHOULDER: ICD-10-CM

## 2025-06-10 PROCEDURE — 97110 THERAPEUTIC EXERCISES: CPT

## 2025-06-10 NOTE — PROGRESS NOTES
Outpatient Rehab    Physical Therapy Visit    Patient Name: Alexx Aguilar  MRN: [id number]  YOB: 1995  Encounter Date: 6/10/2025    Therapy Diagnosis:   Encounter Diagnoses   Name Primary?    Stiffness of left shoulder joint Yes    Muscle wasting and atrophy, not elsewhere classified, left shoulder     Back pain with sciatica     Chronic pain of multiple joints      Physician: Khalida Wesley NP    Physician Orders: Eval and Treat  Medical Diagnosis: Chronic pain of multiple joints  Surgical Diagnosis: Not applicable for this Episode   Surgical Date: Not applicable for this Episode    Visit # / Visits Authorized:  6 / 20  Insurance Authorization Period: 5/3/2025 to 7/3/2025  Date of Evaluation: 5/2/2025 5/14/2025  Plan of Care Certification:       PT/PTA: PT   Number of PTA visits since last PT visit:0  Time In: 1500   Time Out: 1538  Total Time (in minutes): 38   Total Billable Time (in minutes): 38    Subjective   Patient reports minimal to no back, hip and shoulder discomfort today..         Objective            Treatment:  Therapeutic Exercise  TE 1: supine clams with RTB  TE 2: supine bridges  TE 3: prone hip extension  TE 4: Bent knee fall outs  TE 5: adductor squeezes  TE 6: recumbent stepper scifit 15 minutes hills level 3  TE 7: glut squeezes 3x10      Time Entry(in minutes):  Therapeutic Exercise Time Entry: 38    Assessment & Plan   Assessment: patient with appropriate post exercises soreness and fatigue following treatment. PT feels patient could continue to benefit from interventiont to progress towards achievement of goals. Continue with current POC.       The patient will continue to benefit from skilled outpatient physical therapy in order to address the deficits listed in the problem list on the initial evaluation, provide patient and family education, and maximize the patients level of independence in the home and community environments.     The patient's spiritual, cultural,  and educational needs were considered, and the patient is agreeable to the plan of care and goals.           Plan: Planned therapy interventions include: Therapeutic exercise, Therapeutic activities, Neuromuscular re-education, Manual therapy, and ADLs/IADLs.    Planned modalities to include: Electrical stimulation - attended, Electrical stimulation - passive/unattended, Thermotherapy (hot pack), and Cryotherapy (cold pack).         Visit Frequency: 2 times Per Week for 8 Weeks.  Certification dates: 5/2/2025-6/27/2025 (6 weeks remainder of current POC)    Goals:   L Shoulder Problems       L Shoulder Problems (Active)       Functional outcome       Patient will show a significant change in FOTO patient-reported outcome tool to demonstrate subjective improvement       Start:  05/02/25    Expected End:  06/27/25            Patient stated goal: Decrease pain in L shoulder to be able to perform all ADLs pain free        Start:  05/02/25    Expected End:  06/27/25            Patient will demonstrate independence in home program for support of progression       Start:  05/02/25    Expected End:  06/27/25               Hand and arm use       Patient will reach overhead and behind back with no shoulder pain        Start:  05/02/25    Expected End:  06/27/25               Lifting & carrying objects       Patient will lift all objects for ADLs with no L shoulder pain        Start:  05/02/25    Expected End:  06/27/25               Pain       Patient will report pain of 0/10 demonstrating a reduction of overall pain (Progressing)       Start:  05/02/25    Expected End:  06/27/25               Range of Motion       Patient will achieve left shoulder flexion of 175 degrees       Start:  05/02/25    Expected End:  06/27/25            Patient will achieve left shoulder internal rotation of contralateral inferior angle degrees in neutral       Start:  05/02/25    Expected End:  06/27/25               Strength       Patient will  "achieve left shoulder flexion strength of 5/5       Start:  05/02/25    Expected End:  06/27/25            Patient will achieve left shoulder abduction strength of 5/5       Start:  05/02/25    Expected End:  06/27/25            Patient will achieve left shoulder external rotation strength of 5/5 in neutral       Start:  05/02/25    Expected End:  06/27/25                 chronic pain of multiple joints, hips/knees Problems       chronic pain of multiple joints, hips/knees Problems (Active)       Physical Therapy       STG 3 weeks:   1. Patient demonstrates independence with home exercise program and progression.  2. Patient demonstrates postural awareness with all activities.   3. Patient reports no falls with functional activities.     LTG 6 weeks:  1. Patient demonstrates improvement in FOTO score 80 or greater to demonstrate improvement in functional activities tolerance.   2. Patient demonstrates improvement in LE strength by 1/2 grade or greater.   3. Patient demonstrates gait with minimal to no gait deviations.   4. Patient demonstrates ability to ascend/descend 8" step with minimal to no deviations.   5. Patient demonstrates improvement in hamstring and piriformis flexibility (bilateral).   6. Patient demonstrates decreased frequency, intensity and duration of pain symptoms.   7. Goals PRN.       Physical Therapy Goal (Progressing)       Start:  05/14/25    Expected End:  06/27/25       STG 3 weeks:   1. Patient demonstrates independence with home exercise program and progression.  2. Patient demonstrates postural awareness with all activities.   3. Patient reports no falls with functional activities.     LTG 6 weeks:  1. Patient demonstrates improvement in FOTO score 80 or greater to demonstrate improvement in functional activities tolerance.   2. Patient demonstrates improvement in LE strength by 1/2 grade or greater.   3. Patient demonstrates gait with minimal to no gait deviations.   4. Patient " "demonstrates ability to ascend/descend 8" step with minimal to no deviations.   5. Patient demonstrates improvement in hamstring and piriformis flexibility (bilateral).   6. Patient demonstrates decreased frequency, intensity and duration of pain symptoms.   7. Goals PRN.                 Janie Noel, PT, MPT  6/10/2025  "

## 2025-06-17 ENCOUNTER — DOCUMENTATION ONLY (OUTPATIENT)
Facility: HOSPITAL | Age: 30
End: 2025-06-17
Payer: MEDICAID

## 2025-06-17 NOTE — PROGRESS NOTES
"Patient reports he ran out of his depression medication over the weekend however, he saw the  yesterday and got the medicine refilled. Patient was on recumbent stepper and reported he needed to stop bc he wasn't feeling well and needed to go the the restroom. After exiting the restroom patient reported his "guts wasn't doing well" and he needed to go for the day.     "

## 2025-06-18 ENCOUNTER — TELEPHONE (OUTPATIENT)
Dept: PRIMARY CARE CLINIC | Facility: CLINIC | Age: 30
End: 2025-06-18
Payer: MEDICAID

## 2025-07-09 PROBLEM — M75.102 TEAR OF LEFT SUPRASPINATUS TENDON: Status: ACTIVE | Noted: 2025-07-09

## 2025-07-15 ENCOUNTER — DOCUMENTATION ONLY (OUTPATIENT)
Facility: HOSPITAL | Age: 30
End: 2025-07-15
Payer: MEDICAID

## 2025-07-15 NOTE — PROGRESS NOTES
PHYSICAL THERAPY DISCHARGE:    This patient was originally evaluated at our facility on: 5/2/2025         Pt attended PT for a total of 6 Visits receiving: Manual Therapy, Therex, Postural Education, Body Mechanics Training, Home Exercise Instruction     This patient's last visit occurred on:Outpatient Rehab     Physical Therapy Visit     Patient Name: Alexx Aguilar  MRN: [id number]  YOB: 1995  Encounter Date: 6/10/2025     Therapy Diagnosis:        Encounter Diagnoses   Name Primary?    Stiffness of left shoulder joint Yes    Muscle wasting and atrophy, not elsewhere classified, left shoulder      Back pain with sciatica      Chronic pain of multiple joints        Physician: Khalida Wesley NP     Physician Orders: Eval and Treat  Medical Diagnosis: Chronic pain of multiple joints  Surgical Diagnosis: Not applicable for this Episode   Surgical Date: Not applicable for this Episode     Visit # / Visits Authorized:  6 / 20  Insurance Authorization Period: 5/3/2025 to 7/3/2025  Date of Evaluation: 5/2/2025 5/14/2025  Plan of Care Certification:                  PT/PTA: PT   Number of PTA visits since last PT visit:0  Time In: 1500   Time Out: 1538  Total Time (in minutes): 38   Total Billable Time (in minutes): 38     Subjective  Patient reports minimal to no back, hip and shoulder discomfort today..          Objective     Treatment:  Therapeutic Exercise  TE 1: supine clams with RTB  TE 2: supine bridges  TE 3: prone hip extension  TE 4: Bent knee fall outs  TE 5: adductor squeezes  TE 6: recumbent stepper scifit 15 minutes hills level 3  TE 7: glut squeezes 3x10        Time Entry(in minutes):  Therapeutic Exercise Time Entry: 38     Assessment & Plan  Assessment: patient with appropriate post exercises soreness and fatigue following treatment. PT feels patient could continue to benefit from interventiont to progress towards achievement of goals. Continue with current POC.     The patient will  continue to benefit from skilled outpatient physical therapy in order to address the deficits listed in the problem list on the initial evaluation, provide patient and family education, and maximize the patients level of independence in the home and community environments.      The patient's spiritual, cultural, and educational needs were considered, and the patient is agreeable to the plan of care and goals.             Plan: Planned therapy interventions include: Therapeutic exercise, Therapeutic activities, Neuromuscular re-education, Manual therapy, and ADLs/IADLs.    Planned modalities to include: Electrical stimulation - attended, Electrical stimulation - passive/unattended, Thermotherapy (hot pack), and Cryotherapy (cold pack).         Visit Frequency: 2 times Per Week for 8 Weeks.  Certification dates: 5/2/2025-6/27/2025 (6 weeks remainder of current POC)     Goals:   L Shoulder Problems         L Shoulder Problems (Active)         Functional outcome         Patient will show a significant change in FOTO patient-reported outcome tool to demonstrate subjective improvement         Start:  05/02/25    Expected End:  06/27/25              Patient stated goal: Decrease pain in L shoulder to be able to perform all ADLs pain free          Start:  05/02/25    Expected End:  06/27/25              Patient will demonstrate independence in home program for support of progression         Start:  05/02/25    Expected End:  06/27/25                 Hand and arm use         Patient will reach overhead and behind back with no shoulder pain          Start:  05/02/25    Expected End:  06/27/25                 Lifting & carrying objects         Patient will lift all objects for ADLs with no L shoulder pain          Start:  05/02/25    Expected End:  06/27/25                 Pain         Patient will report pain of 0/10 demonstrating a reduction of overall pain (Progressing)         Start:  05/02/25    Expected End:  06/27/25    "              Range of Motion         Patient will achieve left shoulder flexion of 175 degrees         Start:  05/02/25    Expected End:  06/27/25              Patient will achieve left shoulder internal rotation of contralateral inferior angle degrees in neutral         Start:  05/02/25    Expected End:  06/27/25                 Strength         Patient will achieve left shoulder flexion strength of 5/5         Start:  05/02/25    Expected End:  06/27/25              Patient will achieve left shoulder abduction strength of 5/5         Start:  05/02/25    Expected End:  06/27/25              Patient will achieve left shoulder external rotation strength of 5/5 in neutral         Start:  05/02/25    Expected End:  06/27/25                  chronic pain of multiple joints, hips/knees Problems         chronic pain of multiple joints, hips/knees Problems (Active)         Physical Therapy         STG 3 weeks:   1. Patient demonstrates independence with home exercise program and progression.  2. Patient demonstrates postural awareness with all activities.   3. Patient reports no falls with functional activities.      LTG 6 weeks:  1. Patient demonstrates improvement in FOTO score 80 or greater to demonstrate improvement in functional activities tolerance.   2. Patient demonstrates improvement in LE strength by 1/2 grade or greater.   3. Patient demonstrates gait with minimal to no gait deviations.   4. Patient demonstrates ability to ascend/descend 8" step with minimal to no deviations.   5. Patient demonstrates improvement in hamstring and piriformis flexibility (bilateral).   6. Patient demonstrates decreased frequency, intensity and duration of pain symptoms.   7. Goals PRN.         Physical Therapy Goal (Progressing)         Start:  05/14/25    Expected End:  06/27/25        STG 3 weeks:   1. Patient demonstrates independence with home exercise program and progression.  2. Patient demonstrates postural awareness with " "all activities.   3. Patient reports no falls with functional activities.      LTG 6 weeks:  1. Patient demonstrates improvement in FOTO score 80 or greater to demonstrate improvement in functional activities tolerance.   2. Patient demonstrates improvement in LE strength by 1/2 grade or greater.   3. Patient demonstrates gait with minimal to no gait deviations.   4. Patient demonstrates ability to ascend/descend 8" step with minimal to no deviations.   5. Patient demonstrates improvement in hamstring and piriformis flexibility (bilateral).   6. Patient demonstrates decreased frequency, intensity and duration of pain symptoms.   7. Goals PRN.                   Janie Noel, PT, MPT  6/10/2025       Therapist: Janie Noel PT  7/15/2025    "

## 2025-07-16 ENCOUNTER — OFFICE VISIT (OUTPATIENT)
Dept: OBSTETRICS AND GYNECOLOGY | Facility: CLINIC | Age: 30
End: 2025-07-16
Payer: MEDICAID

## 2025-07-16 VITALS
DIASTOLIC BLOOD PRESSURE: 58 MMHG | WEIGHT: 156 LBS | BODY MASS INDEX: 29.45 KG/M2 | SYSTOLIC BLOOD PRESSURE: 124 MMHG | HEIGHT: 61 IN | HEART RATE: 99 BPM

## 2025-07-16 DIAGNOSIS — N76.0 ACUTE VAGINITIS: Primary | ICD-10-CM

## 2025-07-16 DIAGNOSIS — Z90.710 HISTORY OF HYSTERECTOMY: ICD-10-CM

## 2025-07-16 DIAGNOSIS — N89.8 VAGINAL ITCHING: ICD-10-CM

## 2025-07-16 DIAGNOSIS — B37.31 YEAST VAGINITIS: ICD-10-CM

## 2025-07-16 PROCEDURE — 3078F DIAST BP <80 MM HG: CPT | Mod: CPTII,,, | Performed by: OBSTETRICS & GYNECOLOGY

## 2025-07-16 PROCEDURE — 99213 OFFICE O/P EST LOW 20 MIN: CPT | Mod: PBBFAC | Performed by: OBSTETRICS & GYNECOLOGY

## 2025-07-16 PROCEDURE — 99213 OFFICE O/P EST LOW 20 MIN: CPT | Mod: S$PBB,,, | Performed by: OBSTETRICS & GYNECOLOGY

## 2025-07-16 PROCEDURE — 3008F BODY MASS INDEX DOCD: CPT | Mod: CPTII,,, | Performed by: OBSTETRICS & GYNECOLOGY

## 2025-07-16 PROCEDURE — 81515 NFCT DS BV&VAGINITIS DNA ALG: CPT | Performed by: OBSTETRICS & GYNECOLOGY

## 2025-07-16 PROCEDURE — 99999 PR PBB SHADOW E&M-EST. PATIENT-LVL III: CPT | Mod: PBBFAC,,, | Performed by: OBSTETRICS & GYNECOLOGY

## 2025-07-16 PROCEDURE — 3074F SYST BP LT 130 MM HG: CPT | Mod: CPTII,,, | Performed by: OBSTETRICS & GYNECOLOGY

## 2025-07-16 RX ORDER — FLUCONAZOLE 100 MG/1
100 TABLET ORAL DAILY
Qty: 7 TABLET | Refills: 0 | Status: SHIPPED | OUTPATIENT
Start: 2025-07-16 | End: 2025-07-23

## 2025-07-16 RX ORDER — TERCONAZOLE 4 MG/G
1 CREAM VAGINAL NIGHTLY
Qty: 45 G | Refills: 0 | Status: SHIPPED | OUTPATIENT
Start: 2025-07-16

## 2025-07-16 NOTE — PROGRESS NOTES
"Subjective:    Patient ID: Jonas Aguilar is a 30 y.o. y.o. female    Chief Complaint:   Chief Complaint   Patient presents with    Vaginitis     Vaginal itching/irritation        History of Present Illness:  Jonas presents today for {VISIT TYPE:98134::"evaluation"} of ***    Recently had rotator cuff surgery      Review of Systems      Objective:    Vital Signs:  Vitals:    25 1033   BP: (!) 124/58   Pulse: 99     Wt Readings from Last 1 Encounters:   25 70.8 kg (156 lb)     Body mass index is 29.48 kg/m².    Physical Exam:  General:  {Exam; general:231784790:x:"alert,normal appearing gravid female"}   Skin:  {Findings; skin exam-one line:65287:x:"Skin color, texture, turgor normal. No rashes or lesions"}   Neck: {EXAM; NECK:18212:x:supple, trachea midline, no adenopathy or thyromegaly}   Respiratory:  {Exam; lun:x:"clear to auscultation bilaterally"}   Heart:  {Exam; heart:5510:x:"regular rate and rhythm, S1, S2 normal, no murmur, click, rub or gallop"}   Abdomen:  {EXAM; ABDOMEN:24750:x:"Soft, non-tender. Bowel sounds normal. No masses,  no organomegaly"}   Pelvis: External genitalia: Mild erythema to bilateral labia minora  Urinary system: {gu exam:32476:x:"urethral meatus normal, bladder nontender"}  Vaginal: discharge, white and thick; vaginosis screen obtained  Cervix: {cervix exam:89988:x:"normal appearance"}  Uterus: {uterus exam:28952:x:"normal single, nontender"}  Adnexa: {pelvic adnexa exam:12845:x:"normal bimanual exam"}     ***          Assessment:      1. Acute vaginitis          Plan:      Acute vaginitis  -     Vaginosis Screen by DNA Probe           Corrine Hernandez MD, FACOG   2025 11:02 AM       " fluconazole (DIFLUCAN) 100 MG tablet; Take 1 tablet (100 mg total) by mouth once daily. for 7 days  Dispense: 7 tablet; Refill: 0  -     terconazole (TERAZOL 7) 0.4 % Crea; Place 1 applicator vaginally every evening.  Dispense: 45 g; Refill: 0    History of hysterectomy           Corrine Hernandez MD, FACOG   07/16/2025 11:02 AM

## 2025-07-19 LAB
BACTERIAL VAGINOSIS DNA (OHS): NOT DETECTED
CANDIDA GLABRATA/KRUSEI DNA (OHS): NOT DETECTED
CANDIDA SPECIES DNA (OHS): DETECTED
TRICHOMONAS VAGINALIS DNA (OHS): NOT DETECTED

## 2025-07-21 ENCOUNTER — RESULTS FOLLOW-UP (OUTPATIENT)
Dept: OBSTETRICS AND GYNECOLOGY | Facility: CLINIC | Age: 30
End: 2025-07-21
Payer: MEDICAID

## 2025-07-23 ENCOUNTER — OFFICE VISIT (OUTPATIENT)
Dept: ORTHOPEDICS | Facility: CLINIC | Age: 30
End: 2025-07-23
Payer: MEDICAID

## 2025-07-23 DIAGNOSIS — M75.102 TEAR OF LEFT SUPRASPINATUS TENDON: ICD-10-CM

## 2025-07-23 DIAGNOSIS — Z98.890 S/P ROTATOR CUFF REPAIR: Primary | ICD-10-CM

## 2025-07-23 PROCEDURE — 99213 OFFICE O/P EST LOW 20 MIN: CPT | Mod: PBBFAC,PN

## 2025-07-23 PROCEDURE — 99999 PR PBB SHADOW E&M-EST. PATIENT-LVL III: CPT | Mod: PBBFAC,,,

## 2025-07-23 NOTE — PROGRESS NOTES
Subjective:      Patient ID: Jonas Aguilar is a 30 y.o. adult.    Chief Complaint: Post-op Follow Up    HPI: Jonas Aguilar returns for a 2 week post-op visit following: left shoulder arthroscopy with subacromial decompression and left shoulder arthroscopic rotator cuff repair using opus suture anchor x 1na (date of surgery 07/09/2025) with Dr. Gautam. Overall, the patient is doing well with no complaints of fever, chills, nausea, vomiting, SOB, chest pains, or drainage to surgical incision. The patient reports that pain has been managed with medication. He has not begun formal PT/OT yet, but maintains compliance with activity restrictions. Post-operative complaints include: none.       PAST MEDICAL HISTORY:    Past Medical History:   Diagnosis Date    ADHD (attention deficit hyperactivity disorder)     Anxiety     Asthma     exercise induced    Depression     Fibromyalgia     Gender dysphoria     Irritable bowel syndrome (IBS)     with constipation    MCTD (mixed connective tissue disease)     Nontraumatic complete tear of left rotator cuff     Papilledema     spinal fluid collects in brain and pushes on eyes    PONV (postoperative nausea and vomiting)     Sciatica     Vitamin D deficiency disease 06/30/2014     MEDICATIONS: Current Medications[1]  ALLERGIES:   Review of patient's allergies indicates:   Allergen Reactions    Dilaudid [hydromorphone]     Lactose Diarrhea    Penicillins        Review of Systems:  Constitution: Negative for chills, fever and night sweats.   HENT: Negative for congestion and headaches.    Eyes: Negative for blurred vision or vision loss.  Cardiovascular: Negative for chest pain and syncope.   Respiratory: Negative for cough and shortness of breath.    Endocrine: Negative for polydipsia, polyphagia and polyuria.   Hematologic/Lymphatic: Negative for bleeding problem. Does not bruise/bleed easily.   Skin: Negative for dry skin, itching and rash.   Musculoskeletal: See HPI.    Gastrointestinal: Negative for abdominal pain and bowel incontinence.   Genitourinary: Negative for bladder incontinence and nocturia.   Neurological: Negative for disturbances in coordination, loss of balance and seizures.   Psychiatric/Behavioral: Negative for depression. The patient does not have insomnia.    Allergic/Immunologic: Negative for hives and persistent infections.        Objective:      There were no vitals filed for this visit.    PHYSICAL EXAM:  General: Alert & oriented x3, well-developed and well-nourished, in no acute distress, sitting comfortably in the exam room.  Skin: Warm and dry. Capillary refill less than 2 seconds.   Head: Normocephalic and atraumatic.   Eyes: Sclera appear normal.   Nose: No deformities seen.   Ears: No deformities seen.   Neck: No tracheal deviation present.   Pulmonary/Chest: Breathing unlabored.   Neurological: Alert and oriented to person, place, and time.   Psychiatric: Mood is pleasant and affect appropriate.       LEFT SHOULDER        Observation/Inspection:    Surgical wound margins are well approximated and healing nicely.    No redness, warmth, drainage, or other signs of infection.        Palpation:    Tenderness to palpation to the incision sites.   Otherwise, negative tenderness to palpation to bony prominences and soft tissues throughout.        Range of Motion:   AROM not tested today.  Full ROM to elbow, wrist and fingers.         Strength Testing:  Strength not tested today        Neurovascular Exam Bilateral UEs:  Sensation intact to light touch in the distal median, radial, and ulnar nerve distributions bilaterally.  Capillary refill intact <2 seconds in all digits bilaterally    Imaging:  None today.        Assessment:       1. S/P rotator cuff repair    2. Tear of left supraspinatus tendon        Plan:       Orders Placed This Encounter    Ambulatory Referral/Consult to Physical Therapy         2 weeks s/p left shoulder arthroscopy with subacromial  decompression and left shoulder arthroscopic rotator cuff repair    Overall patient is doing well post-operatively.    Sutures removed today without complication. Incisions are well healed, with no drainage, erythema, or signs of infection.     Wound Care:  Regular wound care explained to the patient.  Okay to wash incisions with soap and water and pat to dry.  Medications:  Continue with OTC Tylenol and/or NSAIDs as needed for pain.   Antibiotics:  None prescribed today.  No evidence of wound infection.  Physical Therapy:  Ambulatory referral to physical therapy for rotator cuff repair protocol.  HEP:  71597 - Antonia Henriquez PA-C instructed and demonstrated a passive ROM shoulder HEP. The patient then demonstrated understanding of exercises and proper technique. This program was performed for 10 minutes.   Activity Modifications:  No active ROM, lifting, pushing, pulling with left arm at this time.   DME:  Continue use of pillow sling when out of the house. Okay to remove sling for bathing and watching TV. Okay to do computer work (with sling on). Wear sling when walking and when leaving the house. Okay to take sling off for sleeping as long as patient has a regular pillow under operative arm for support.  Pain Management: Ice compress to the affected area 2-3x a day for 15-20 minutes as needed for pain management.      Follow-Up: 4 weeks for POV #2.     All of the patient's questions were answered and the patient will contact us if they have any questions or concerns in the interim.        Antonia Henriquez PA-C  [facility]  Orthopedic Surgery    Medical Dictation software was used during the dictation of portions or the entirety of this medical record.  Phonetic or grammatic errors may exist due to the use of this software. For clarification, refer to the author of the document.             [1]   Current Outpatient Medications:     albuterol (PROVENTIL/VENTOLIN HFA) 90 mcg/actuation inhaler, Inhale 2 puffs  "into the lungs as needed. Instructed to bring to hospital, Disp: , Rfl:     atomoxetine (STRATTERA) 80 MG capsule, Take 80 mg by mouth every morning. Instructed to hold dos, Disp: , Rfl:     clindamycin (CLEOCIN T) 1 % lotion, Apply 1 application  topically as needed. Instructed to hold dos, Disp: , Rfl:     DULoxetine (CYMBALTA) 60 MG capsule, Take 60 mg by mouth 2 (two) times daily., Disp: , Rfl:     finasteride (PROSCAR) 5 mg tablet, Take 5 mg by mouth once daily., Disp: , Rfl:     fluconazole (DIFLUCAN) 100 MG tablet, Take 1 tablet (100 mg total) by mouth once daily. for 7 days, Disp: 7 tablet, Rfl: 0    gabapentin (NEURONTIN) 800 MG tablet, Take 1 tablet (800 mg total) by mouth 3 (three) times daily., Disp: 90 tablet, Rfl: 11    HYDROcodone-acetaminophen (NORCO) 7.5-325 mg per tablet, Take 1 tablet by mouth every 4 (four) hours as needed for Pain., Disp: 30 tablet, Rfl: 0    linaCLOtide (LINZESS) 72 mcg Cap capsule, Take 1 capsule (72 mcg total) by mouth before breakfast., Disp: 30 each, Rfl: 11    minoxidiL (LONITEN) 2.5 MG tablet, Take 2.5 mg by mouth every evening., Disp: , Rfl:     needle, disp, 18 G 18 gauge x 1" Ndle, BD Regular Bevel Needles 18 gauge x 1"  USE 1 EACH EVERY 7 DAYS TO DRAW UP TESTOTERONE, Disp: , Rfl:     syringe with needle 3 mL 21 gauge x 1 1/2" Syrg, BD Luer-Edmar Syringe 3 mL 21 gauge x 1 1/2"  USE 1 SYRINGE FOR TESTOSTERONE INJ EVERY 7 DAYS.(SYRINGE SIZE DIFFERENT/MD), Disp: , Rfl:     terconazole (TERAZOL 7) 0.4 % Crea, Place 1 applicator vaginally every evening., Disp: 45 g, Rfl: 0    testosterone cypionate (DEPOTESTOTERONE CYPIONATE) 200 mg/mL injection, Inject into the muscle once a week., Disp: , Rfl:     UNKNOWN TO PATIENT, PKG 3.5G VERONICA MILLER EACHES  Medical marijuana a few times a day as needed, Disp: , Rfl:     "

## 2025-07-25 ENCOUNTER — TELEPHONE (OUTPATIENT)
Dept: ORTHOPEDICS | Facility: CLINIC | Age: 30
End: 2025-07-25
Payer: MEDICAID

## 2025-07-25 DIAGNOSIS — Z98.890 S/P ROTATOR CUFF REPAIR: Primary | ICD-10-CM

## 2025-07-25 NOTE — TELEPHONE ENCOUNTER
Copied from CRM #[id number]. Topic: General Inquiry - Patient Advice  >> Jul 25, 2025  9:46 AM Kesyha wrote:  Type: Patient Call Back    Who called:Pt    What is the request in detail:Referral needs to say Occupational therapy instead of Physical therapy    Can the clinic reply by [facility]?no    Would the patient rather a call back or a response via [facility]? call    Best call back number:Telephone Information:  Mobile          [phone]          Additional Information:  >> Jul 25, 2025 10:18 AM Natalie Henriquez PA-C wrote:  Order placed. Thanks!  ----- Message -----  From: Tamara Carlson MA  Sent: 7/25/2025  10:11 AM CDT  To: ROBBIE Madrid , Can you place a order for this pt for occupational therapy instead of physical therapy ? Please advise. Thanks!  ----- Message -----  From: Keysha Morrison  Sent: 7/25/2025   9:49 AM CDT  To: Martinez Knight Staff    >> Jul 25, 2025 10:11 AM Med Assistant Tamara wrote:  Nikole Knight , Can you place a order for this pt for occupational therapy instead of physical therapy ? Please advise. Thanks!  ----- Message -----  From: Keysha Morrison  Sent: 7/25/2025   9:49 AM CDT  To: Martinez Knight Staff      Called pt informed that referral was placed for occupational therapy . Pt gave verbal understanding.call ended

## 2025-07-28 ENCOUNTER — CLINICAL SUPPORT (OUTPATIENT)
Facility: HOSPITAL | Age: 30
End: 2025-07-28
Payer: MEDICAID

## 2025-07-28 DIAGNOSIS — Z98.890 S/P LEFT ROTATOR CUFF REPAIR: Primary | ICD-10-CM

## 2025-07-28 DIAGNOSIS — Z98.890 S/P ROTATOR CUFF REPAIR: ICD-10-CM

## 2025-07-28 PROCEDURE — 97165 OT EVAL LOW COMPLEX 30 MIN: CPT

## 2025-07-28 PROCEDURE — 97530 THERAPEUTIC ACTIVITIES: CPT

## 2025-07-28 NOTE — PROGRESS NOTES
Outpatient Rehab    Occupational Therapy Evaluation    Patient Name: Alexx Aguilar  MRN: [id number]  YOB: 1995  Encounter Date: 7/28/2025    Therapy Diagnosis:   Encounter Diagnoses   Name Primary?    S/P left rotator cuff repair Yes    S/P rotator cuff repair      Physician: Natalie Henriquez, *    Physician Orders: Eval and Treat  Medical Diagnosis: S/P rotator cuff repair  Surgical Diagnosis: Left Rotator cuff repair   Surgical Date: 7/9/2025  Days Since Last Surgery: 19    Visit # / Visits Authorized: 1 / 1  Insurance Authorization Period: 7/25/2025 to 7/25/2026  Date of Evaluation: 7/28/2025  Plan of Care Certification: 7/28/2025 to 9/12/2025     Time In: 1000   Time Out: 1100  Total Time (in minutes): 60   Total Billable Time (in minutes): 60    Intake Outcome Measure for FOTO Survey    Therapist reviewed FOTO scores for Alexx Aguilar on 7/28/2025.   FOTO report - see Media section or FOTO account episode details.     Intake Score (%): 28    Precautions:       Subjective   History of Present Illness  Alexx is a 30 y.o. male who reports to occupational therapy with a chief concern of L shoulder pain.     The patient reports a medical diagnosis of S46.012A (ICD-10-CM) - Supraspinatus tendon rupture, left, initial encounter  M25.512 (ICD-10-CM) - Acute pain of left shoulder. The patient has experienced this issue since 04/29/25.     Patient reports a surgery of Left Rotator cuff repair. Surgery occurred on 07/09/25.     Diagnostic tests related to this condition: MRI studies.     MRI Studies Details: 1. Near complete supraspinatus tendon tear with subacromial-subdeltoid effusion  2. Diffuse abnormal bone marrow signal intensity suggests red marrow conversion or marrow replacement process    Dominant Hand: Right  History of Present Condition/Illness: Prior to surgery, Patient reports pain in his shoulder since a colonoscopy in November 2024.     Activities of Daily Living  Social history  was obtained from Patient.               Previously independent with activities of daily living? Yes     Currently independent with activities of daily living? No  Activities currently needing assistance include Dressing - lower body, Dressing - upper body, and Grooming.        Previously independent with instrumental activities of daily living? Yes     Currently independent with instrumental activities of daily living? No  Activities currently needing assistance include: Driving, Grocery/shopping, Meal prep, and Home establishment and management.            Pain     Patient reports a current pain level of 6/10. Pain at best is reported as 3/10. Pain at worst is reported as 9/10.   Clinical Progression (since onset): Stable  Pain Qualities: Sharp, Dull, Aching  Pain-Relieving Factors: Rest  Pain-Aggravating Factors: Bending, Cooking, Driving, Holding objects, Lifting, Movement, Reaching, Sleeping, Twisting         Living Arrangements  Living Situation  Housing: Home independently  Living Arrangements: Family members  Support Systems: Family members, Friends/neighbors, Parent        Employment     Employment Status: Not employed  Patient attempting to get on disability       Past Medical History/Physical Systems Review:   Jonas Aguilar  has a past medical history of ADHD (attention deficit hyperactivity disorder), Anxiety, Asthma, Depression, Fibromyalgia, Gender dysphoria, Irritable bowel syndrome (IBS), MCTD (mixed connective tissue disease), Nontraumatic complete tear of left rotator cuff, Papilledema, PONV (postoperative nausea and vomiting), Sciatica, and Vitamin D deficiency disease.    Jonas Aguilar  has a past surgical history that includes Mastectomy (Bilateral); Liposuction; wisdom teeth removal; Colonoscopy (N/A, 10/24/2024); hysterectomy, total, laparoscopic, with salpingectomy (Bilateral, 01/02/2025); Other surgical history; Arthroscopic repair of rotator cuff of shoulder (Left, 7/9/2025);  and Acromioplasty (Left, 7/9/2025).    Jonas has a current medication list which includes the following prescription(s): albuterol, atomoxetine, clindamycin, duloxetine, finasteride, gabapentin, hydrocodone-acetaminophen, linaclotide, minoxidil, needle (disp) 18 g, syringe with needle, terconazole, testosterone cypionate, and UNKNOWN TO PATIENT.    Review of patient's allergies indicates:   Allergen Reactions    Dilaudid [hydromorphone]     Lactose Diarrhea    Penicillins         Objective   Bracing   The shoulder brace type is Immobilizer with abduction.   Continue use of pillow sling when out of the house. Okay to remove sling for bathing and watching TV. Okay to do computer work (with sling on). Wear sling when walking and when leaving the house. Okay to take sling off for sleeping as long as patient has a regular pillow under operative arm for support.        Shoulder Range of Motion  Left Shoulder   Active (deg) Passive (deg) Pain   Flexion   30     Extension         Scaption         ABduction   50     ADduction         Horizontal ABduction         Horizontal ADduction         External Rotation (Shoulder ABducted 0 degrees)         External Rotation (Shoulder ABducted 45 degrees)         External Rotation (Shoulder ABducted 90 degrees)         Internal Rotation (Shoulder ABducted 0 degrees)         Internal Rotation (Shoulder ABducted 45 degrees)         Internal Rotation (Shoulder ABducted 90 degrees)                       Shoulder Strength - Planes of Motion   Right Strength Right Pain Left Strength Left  Pain   Flexion 4+   3-     Extension 4+   3-     ABduction 4+   3-     ADduction 4+   3-     Horizontal ABduction 4+   3-     Horizontal ADduction 4+   3-     Internal Rotation 0° 4+   3-     Internal Rotation 90° 4+   3-     External Rotation 0° 4+   3-     External Rotation 90° 4+   3-                      Treatment:  Therapeutic Activity  TA 1: HEP education    Time Entry(in minutes):  OT Evaluation  (Low) Time Entry: 50  Therapeutic Activity Time Entry: 10    Assessment & Plan   Assessment  Alexx presents with a condition of Low complexity.   Presentation of Symptoms: Stable  Will Comorbidities Impact Care: No       ADL Limitations : Bathing/showering, Dressing, Personal hygiene and grooming  IADL Limitations: Driving, Grocery/shopping, Health management and maintenance, Meal preparation and cleanup  Rest and Sleep Limitations: Sleep preparation  Work Limitations: Job performance  Leisure Limitations: Leisure participation  Functional Limitations: Activity tolerance, Bed mobility, Carrying objects, Fine motor coordination, Manipulating objects, Pain when reaching, Pain with ADLs/IADLs, Range of motion, Reaching         Personal Factors Affecting Prognosis: Physical limitations, Pain, Fear/anxiety       Evaluation/Treatment Response: Patient limited by pain  Patient Goal for Therapy (OT): Increase functional use of L UE and increase independence in ADLs  Prognosis: Good  Assessment Details: Pt presents with limitations as described in problem list. Patient can benefit from Occupational Therapy services for limitations as described in problem list below.  Patient presents with weakness and loss of AROM in LUE he has deficits with Active ROM, ADL tolerance and functional independence. He is demonstrating LUE weakness and ROM deficits. Treatment will be focussed on improving LUE strength and ROM  to improve proper soft tissue facilitation in order to activate appropriate musculature to preform functional activities and improve overall functional independence in ADLs. The following goals below were discussed with the patient and he is in agreement with them as to be addressed in the treatment plan.     Plan  From an occupational therapy perspective, the patient would benefit from: Skilled Rehab Services    Planned therapy interventions include: Therapeutic exercise, Therapeutic activities, Neuromuscular  re-education, Manual therapy, and ADLs/IADLs.    Planned modalities to include: Cryotherapy (cold pack), Ultrasound, Thermotherapy (hot pack), Other (Comment), and Electrical stimulation - attended. Dry needling as needed      Visit Frequency: 2 times Per Week for 6 Weeks.       This plan was discussed with Patient.   Discussion participants: Agreed Upon Plan of Care  Plan details: Certification Dates: 7/28/2025 - 9/12/2025          The patient's spiritual, cultural, and educational needs were considered, and the patient is agreeable to the plan of care and goals.           Goals:   Active       Dressing       Patient will demonstrate independence with upper body donning and doffing pullover shirt        Start:  07/28/25    Expected End:  09/12/25            Patient will demonstrate independence with lower body donning and doffing pants with a zipper        Start:  07/28/25    Expected End:  09/12/25               Functional outcome       Patient will show a significant change in FOTO patient-reported outcome tool to demonstrate subjective improvement       Start:  07/28/25    Expected End:  09/12/25            Patient stated goal: Increase functional use of L UE and increase independence in ADLs        Start:  07/28/25    Expected End:  09/12/25            Patient will demonstrate independence in home program for support of progression       Start:  07/28/25    Expected End:  09/12/25               Hygiene and grooming       Patient will wash hair with no assistance       Start:  07/28/25    Expected End:  09/12/25               Leisure activities       Patient will participate in valorie activities independently       Start:  07/28/25    Expected End:  09/12/25               Lifting & carrying objects       Patient will lift objects needed for independence in activities of daily living       Start:  07/28/25    Expected End:  09/12/25               Pain       Patient will report pain of 3/10 demonstrating a  reduction of overall pain       Start:  07/28/25    Expected End:  09/12/25               Range of Motion       Patient will achieve left shoulder flexion of 100 degrees       Start:  07/28/25    Expected End:  09/12/25            Patient will achieve left shoulder abduction of 100 degrees       Start:  07/28/25    Expected End:  09/12/25               Strength       Patient will achieve left shoulder flexion strength of -4/5       Start:  07/28/25    Expected End:  09/12/25            Patient will achieve left shoulder abduction strength of -4/5       Start:  07/28/25    Expected End:  09/12/25                ePter Noel OT

## 2025-08-04 ENCOUNTER — CLINICAL SUPPORT (OUTPATIENT)
Facility: HOSPITAL | Age: 30
End: 2025-08-04
Payer: MEDICAID

## 2025-08-04 DIAGNOSIS — Z98.890 S/P LEFT ROTATOR CUFF REPAIR: Primary | ICD-10-CM

## 2025-08-04 PROCEDURE — 97530 THERAPEUTIC ACTIVITIES: CPT

## 2025-08-04 NOTE — PROGRESS NOTES
Outpatient Rehab    Occupational Therapy Visit    Patient Name: Alexx Aguilar  MRN: [id number]  YOB: 1995  Encounter Date: 8/4/2025    Therapy Diagnosis:   Encounter Diagnosis   Name Primary?    S/P left rotator cuff repair Yes     Physician: Natalie Henriquez, *    Physician Orders: Eval and Treat  Medical Diagnosis: S/P rotator cuff repair  Surgical Diagnosis: Left Rotator cuff repair   Surgical Date: 7/9/2025  Days Since Last Surgery: 26    Visit # / Visits Authorized: 1 / 12  Insurance Authorization Period: 7/29/2025 to 9/10/2025  Date of Evaluation: 7/28/2025  Plan of Care Certification: 7/28/2025 to 9/12/2025      Time In: 1050   Time Out: 1146  Total Time (in minutes): 56   Total Billable Time (in minutes): 56    FOTO:  Intake Score (%): 28  Survey Score 2 (%): Not applicable for this Episode  Survey Score 3 (%): Not applicable for this Episode    Precautions:       Subjective   Im ok, I suppose.  Pain reported as 6/10.      Objective            Treatment:  Therapeutic Activity  TA 1: Ball roll outs 3x10 shoulder flexion and scaption  TA 2: Red Flexbar 3- way 3x10  TA 3: scapular elevation, retraction 3x10 with 5 sec holds  TA 4: PROM of L shoulder 3x10    Time Entry(in minutes):  Therapeutic Activity Time Entry: 56    Assessment & Plan   Assessment: Pt able to tolerate all activity well this day with mod complaints of increased pain. Patient with soft end feels during passive range of motion but unable to tolerate prolonged stretches. Pt is progressing well towards their goals and there are no updates to goals at this time. Pt prognosis is good Pt will continue to benefit from skilled outpatient occupational therapy to address the deficits listed in the problem list on initial evaluation provide pt/family education and to maximize pt's level of independence in the home and community environment. Pt's spiritual, cultural and educational needs considered and pt agreeable to plan of care  and goals.    Evaluation/Treatment Tolerance: Patient limited by pain    The patient will continue to benefit from skilled outpatient occupational therapy in order to address the deficits listed in the problem list on the initial evaluation, provide patient and family education, and maximize the patients level of independence in the home and community environments.     The patient's spiritual, cultural, and educational needs were considered, and the patient is agreeable to the plan of care and goals.           Plan: From an occupational therapy perspective, the patient would benefit from: Skilled Rehab Services     Planned therapy interventions include: Therapeutic exercise, Therapeutic activities, Neuromuscular re-education, Manual therapy, and ADLs/IADLs.    Planned modalities to include: Cryotherapy (cold pack), Ultrasound, Thermotherapy (hot pack), Other (Comment), and Electrical stimulation - attended. Dry needling as needed       Visit Frequency: 2 times Per Week for 6 Weeks.        This plan was discussed with Patient.   Discussion participants: Agreed Upon Plan of Care  Plan details: Certification Dates: 7/28/2025 - 9/12/2025    Goals:   Active       Dressing       Patient will demonstrate independence with upper body donning and doffing pullover shirt  (Progressing)       Start:  07/28/25    Expected End:  09/12/25            Patient will demonstrate independence with lower body donning and doffing pants with a zipper  (Progressing)       Start:  07/28/25    Expected End:  09/12/25               Functional outcome       Patient will show a significant change in FOTO patient-reported outcome tool to demonstrate subjective improvement (Progressing)       Start:  07/28/25    Expected End:  09/12/25            Patient stated goal: Increase functional use of L UE and increase independence in ADLs  (Progressing)       Start:  07/28/25    Expected End:  09/12/25            Patient will demonstrate independence in  home program for support of progression (Progressing)       Start:  07/28/25    Expected End:  09/12/25               Hygiene and grooming       Patient will wash hair with no assistance (Progressing)       Start:  07/28/25    Expected End:  09/12/25               Leisure activities       Patient will participate in valorie activities independently (Progressing)       Start:  07/28/25    Expected End:  09/12/25               Lifting & carrying objects       Patient will lift objects needed for independence in activities of daily living (Progressing)       Start:  07/28/25    Expected End:  09/12/25               Pain       Patient will report pain of 3/10 demonstrating a reduction of overall pain (Progressing)       Start:  07/28/25    Expected End:  09/12/25               Range of Motion       Patient will achieve left shoulder flexion of 100 degrees (Progressing)       Start:  07/28/25    Expected End:  09/12/25            Patient will achieve left shoulder abduction of 100 degrees (Progressing)       Start:  07/28/25    Expected End:  09/12/25               Strength       Patient will achieve left shoulder flexion strength of -4/5 (Progressing)       Start:  07/28/25    Expected End:  09/12/25            Patient will achieve left shoulder abduction strength of -4/5 (Progressing)       Start:  07/28/25    Expected End:  09/12/25                Peter Noel OT

## 2025-08-07 ENCOUNTER — CLINICAL SUPPORT (OUTPATIENT)
Facility: HOSPITAL | Age: 30
End: 2025-08-07
Payer: MEDICAID

## 2025-08-07 DIAGNOSIS — Z98.890 S/P LEFT ROTATOR CUFF REPAIR: Primary | ICD-10-CM

## 2025-08-07 PROCEDURE — 97530 THERAPEUTIC ACTIVITIES: CPT

## 2025-08-07 NOTE — PROGRESS NOTES
Outpatient Rehab    Occupational Therapy Visit    Patient Name: Alexx Aguilar  MRN: [id number]  YOB: 1995  Encounter Date: 8/7/2025    Therapy Diagnosis:   Encounter Diagnosis   Name Primary?    S/P left rotator cuff repair Yes     Physician: Natalie Henriquez, *    Physician Orders: Eval and Treat  Medical Diagnosis: S/P rotator cuff repair  Surgical Diagnosis: Left Rotator cuff repair   Surgical Date: 7/9/2025  Days Since Last Surgery: 29    Visit # / Visits Authorized: 2 / 12  Insurance Authorization Period: 7/29/2025 to 9/10/2025  Date of Evaluation: 7/28/2025  Plan of Care Certification: 7/28/2025 to 9/12/2025      Time In: 1000   Time Out: 1100  Total Time (in minutes): 60   Total Billable Time (in minutes): 60    FOTO:  Intake Score (%): 28  Survey Score 2 (%): Not applicable for this Episode  Survey Score 3 (%): Not applicable for this Episode    Precautions:       Subjective   My whole body hurts.  Pain reported as 7/10.      Objective            Treatment:  Therapeutic Activity  TA 1: Ball roll outs 3x10 shoulder flexion and scaption  TA 2: Red Flexbar 3- way 3x10  TA 3: scapular elevation, retraction 3x10 with 5 sec holds  TA 4: PROM of L shoulder 3x10    Time Entry(in minutes):  Therapeutic Activity Time Entry: 60    Assessment & Plan   Assessment: Pt able to tolerate all activity well this day with mod complaints of increased pain. Patient with soft end feels during passive range of motion but unable to tolerate prolonged stretches. Pt able to tolerate ~100 degrees flexion Pt is progressing well towards their goals and there are no updates to goals at this time. Pt prognosis is good Pt will continue to benefit from skilled outpatient occupational therapy to address the deficits listed in the problem list on initial evaluation provide pt/family education and to maximize pt's level of independence in the home and community environment. Pt's spiritual, cultural and educational needs  considered and pt agreeable to plan of care and goals.    Evaluation/Treatment Tolerance: Patient tolerated treatment well    The patient will continue to benefit from skilled outpatient occupational therapy in order to address the deficits listed in the problem list on the initial evaluation, provide patient and family education, and maximize the patients level of independence in the home and community environments.     The patient's spiritual, cultural, and educational needs were considered, and the patient is agreeable to the plan of care and goals.           Plan: From an occupational therapy perspective, the patient would benefit from: Skilled Rehab Services     Planned therapy interventions include: Therapeutic exercise, Therapeutic activities, Neuromuscular re-education, Manual therapy, and ADLs/IADLs.    Planned modalities to include: Cryotherapy (cold pack), Ultrasound, Thermotherapy (hot pack), Other (Comment), and Electrical stimulation - attended. Dry needling as needed       Visit Frequency: 2 times Per Week for 6 Weeks.        This plan was discussed with Patient.   Discussion participants: Agreed Upon Plan of Care  Plan details: Certification Dates: 7/28/2025 - 9/12/2025    Goals:   Active       Dressing       Patient will demonstrate independence with upper body donning and doffing pullover shirt  (Progressing)       Start:  07/28/25    Expected End:  09/12/25            Patient will demonstrate independence with lower body donning and doffing pants with a zipper  (Progressing)       Start:  07/28/25    Expected End:  09/12/25               Functional outcome       Patient will show a significant change in FOTO patient-reported outcome tool to demonstrate subjective improvement (Progressing)       Start:  07/28/25    Expected End:  09/12/25            Patient stated goal: Increase functional use of L UE and increase independence in ADLs  (Progressing)       Start:  07/28/25    Expected End:   09/12/25            Patient will demonstrate independence in home program for support of progression (Progressing)       Start:  07/28/25    Expected End:  09/12/25               Hygiene and grooming       Patient will wash hair with no assistance (Progressing)       Start:  07/28/25    Expected End:  09/12/25               Leisure activities       Patient will participate in valorie activities independently (Progressing)       Start:  07/28/25    Expected End:  09/12/25               Lifting & carrying objects       Patient will lift objects needed for independence in activities of daily living (Progressing)       Start:  07/28/25    Expected End:  09/12/25               Pain       Patient will report pain of 3/10 demonstrating a reduction of overall pain (Progressing)       Start:  07/28/25    Expected End:  09/12/25               Range of Motion       Patient will achieve left shoulder flexion of 100 degrees (Progressing)       Start:  07/28/25    Expected End:  09/12/25            Patient will achieve left shoulder abduction of 100 degrees (Progressing)       Start:  07/28/25    Expected End:  09/12/25               Strength       Patient will achieve left shoulder flexion strength of -4/5 (Progressing)       Start:  07/28/25    Expected End:  09/12/25            Patient will achieve left shoulder abduction strength of -4/5 (Progressing)       Start:  07/28/25    Expected End:  09/12/25                Peter Noel OT

## 2025-08-11 ENCOUNTER — CLINICAL SUPPORT (OUTPATIENT)
Facility: HOSPITAL | Age: 30
End: 2025-08-11
Payer: MEDICAID

## 2025-08-11 DIAGNOSIS — Z98.890 S/P LEFT ROTATOR CUFF REPAIR: Primary | ICD-10-CM

## 2025-08-11 PROCEDURE — 97530 THERAPEUTIC ACTIVITIES: CPT

## 2025-08-14 ENCOUNTER — CLINICAL SUPPORT (OUTPATIENT)
Facility: HOSPITAL | Age: 30
End: 2025-08-14
Payer: MEDICAID

## 2025-08-14 DIAGNOSIS — Z98.890 S/P LEFT ROTATOR CUFF REPAIR: Primary | ICD-10-CM

## 2025-08-14 PROCEDURE — 97530 THERAPEUTIC ACTIVITIES: CPT

## 2025-08-18 ENCOUNTER — CLINICAL SUPPORT (OUTPATIENT)
Facility: HOSPITAL | Age: 30
End: 2025-08-18
Payer: MEDICAID

## 2025-08-18 DIAGNOSIS — Z98.890 S/P LEFT ROTATOR CUFF REPAIR: Primary | ICD-10-CM

## 2025-08-18 PROCEDURE — 97530 THERAPEUTIC ACTIVITIES: CPT

## 2025-08-19 ENCOUNTER — OFFICE VISIT (OUTPATIENT)
Dept: PRIMARY CARE CLINIC | Facility: CLINIC | Age: 30
End: 2025-08-19
Payer: MEDICAID

## 2025-08-19 ENCOUNTER — PATIENT OUTREACH (OUTPATIENT)
Dept: ADMINISTRATIVE | Facility: HOSPITAL | Age: 30
End: 2025-08-19
Payer: MEDICAID

## 2025-08-19 VITALS
RESPIRATION RATE: 16 BRPM | SYSTOLIC BLOOD PRESSURE: 109 MMHG | DIASTOLIC BLOOD PRESSURE: 65 MMHG | WEIGHT: 164.5 LBS | TEMPERATURE: 99 F | HEIGHT: 61 IN | HEART RATE: 88 BPM | BODY MASS INDEX: 31.06 KG/M2

## 2025-08-19 DIAGNOSIS — J02.9 SORE THROAT: ICD-10-CM

## 2025-08-19 DIAGNOSIS — J03.90 TONSILLITIS: Primary | ICD-10-CM

## 2025-08-19 LAB
CTP QC/QA: YES
MOLECULAR STREP A: NEGATIVE

## 2025-08-19 PROCEDURE — 1160F RVW MEDS BY RX/DR IN RCRD: CPT | Mod: CPTII,,, | Performed by: NURSE PRACTITIONER

## 2025-08-19 PROCEDURE — 3008F BODY MASS INDEX DOCD: CPT | Mod: CPTII,,, | Performed by: NURSE PRACTITIONER

## 2025-08-19 PROCEDURE — 3078F DIAST BP <80 MM HG: CPT | Mod: CPTII,,, | Performed by: NURSE PRACTITIONER

## 2025-08-19 PROCEDURE — 99213 OFFICE O/P EST LOW 20 MIN: CPT | Mod: S$PBB,,, | Performed by: NURSE PRACTITIONER

## 2025-08-19 PROCEDURE — 3074F SYST BP LT 130 MM HG: CPT | Mod: CPTII,,, | Performed by: NURSE PRACTITIONER

## 2025-08-19 PROCEDURE — 99213 OFFICE O/P EST LOW 20 MIN: CPT | Mod: PBBFAC | Performed by: NURSE PRACTITIONER

## 2025-08-19 PROCEDURE — 1159F MED LIST DOCD IN RCRD: CPT | Mod: CPTII,,, | Performed by: NURSE PRACTITIONER

## 2025-08-19 PROCEDURE — 99999PBSHW POCT STREP A MOLECULAR: Mod: PBBFAC,,,

## 2025-08-19 PROCEDURE — 87651 STREP A DNA AMP PROBE: CPT | Mod: PBBFAC | Performed by: NURSE PRACTITIONER

## 2025-08-19 PROCEDURE — 99999 PR PBB SHADOW E&M-EST. PATIENT-LVL III: CPT | Mod: PBBFAC,,, | Performed by: NURSE PRACTITIONER

## 2025-08-19 RX ORDER — AZITHROMYCIN 250 MG/1
TABLET, FILM COATED ORAL
Qty: 6 TABLET | Refills: 0 | Status: SHIPPED | OUTPATIENT
Start: 2025-08-19 | End: 2025-08-24

## 2025-08-21 ENCOUNTER — CLINICAL SUPPORT (OUTPATIENT)
Facility: HOSPITAL | Age: 30
End: 2025-08-21
Payer: MEDICAID

## 2025-08-21 DIAGNOSIS — Z98.890 S/P LEFT ROTATOR CUFF REPAIR: Primary | ICD-10-CM

## 2025-08-21 PROCEDURE — 97530 THERAPEUTIC ACTIVITIES: CPT

## 2025-08-25 ENCOUNTER — CLINICAL SUPPORT (OUTPATIENT)
Facility: HOSPITAL | Age: 30
End: 2025-08-25
Payer: MEDICAID

## 2025-08-25 DIAGNOSIS — Z98.890 S/P LEFT ROTATOR CUFF REPAIR: Primary | ICD-10-CM

## 2025-08-25 PROCEDURE — 97530 THERAPEUTIC ACTIVITIES: CPT

## 2025-08-26 ENCOUNTER — OFFICE VISIT (OUTPATIENT)
Dept: PAIN MEDICINE | Facility: CLINIC | Age: 30
End: 2025-08-26
Payer: MEDICAID

## 2025-08-26 ENCOUNTER — TELEPHONE (OUTPATIENT)
Dept: PRIMARY CARE CLINIC | Facility: CLINIC | Age: 30
End: 2025-08-26
Payer: MEDICAID

## 2025-08-26 VITALS
SYSTOLIC BLOOD PRESSURE: 110 MMHG | WEIGHT: 162 LBS | OXYGEN SATURATION: 97 % | HEIGHT: 61 IN | HEART RATE: 92 BPM | BODY MASS INDEX: 30.58 KG/M2 | DIASTOLIC BLOOD PRESSURE: 83 MMHG

## 2025-08-26 DIAGNOSIS — R52 GENERALIZED PAIN: ICD-10-CM

## 2025-08-26 DIAGNOSIS — G89.4 CHRONIC PAIN SYNDROME: ICD-10-CM

## 2025-08-26 DIAGNOSIS — M25.50 CHRONIC PAIN OF MULTIPLE JOINTS: ICD-10-CM

## 2025-08-26 DIAGNOSIS — J03.90 TONSILLITIS: ICD-10-CM

## 2025-08-26 DIAGNOSIS — G89.29 CHRONIC PAIN OF MULTIPLE JOINTS: ICD-10-CM

## 2025-08-26 DIAGNOSIS — M79.7 FIBROMYALGIA: Primary | ICD-10-CM

## 2025-08-26 DIAGNOSIS — J02.9 SORE THROAT: Primary | ICD-10-CM

## 2025-08-26 PROCEDURE — 1160F RVW MEDS BY RX/DR IN RCRD: CPT | Mod: CPTII,,, | Performed by: ANESTHESIOLOGY

## 2025-08-26 PROCEDURE — 99999 PR PBB SHADOW E&M-EST. PATIENT-LVL III: CPT | Mod: PBBFAC,,, | Performed by: ANESTHESIOLOGY

## 2025-08-26 PROCEDURE — 1159F MED LIST DOCD IN RCRD: CPT | Mod: CPTII,,, | Performed by: ANESTHESIOLOGY

## 2025-08-26 PROCEDURE — 3074F SYST BP LT 130 MM HG: CPT | Mod: CPTII,,, | Performed by: ANESTHESIOLOGY

## 2025-08-26 PROCEDURE — 99214 OFFICE O/P EST MOD 30 MIN: CPT | Mod: S$PBB,,, | Performed by: ANESTHESIOLOGY

## 2025-08-26 PROCEDURE — 3079F DIAST BP 80-89 MM HG: CPT | Mod: CPTII,,, | Performed by: ANESTHESIOLOGY

## 2025-08-26 PROCEDURE — 99213 OFFICE O/P EST LOW 20 MIN: CPT | Mod: PBBFAC | Performed by: ANESTHESIOLOGY

## 2025-08-26 PROCEDURE — 3008F BODY MASS INDEX DOCD: CPT | Mod: CPTII,,, | Performed by: ANESTHESIOLOGY

## 2025-08-27 ENCOUNTER — OFFICE VISIT (OUTPATIENT)
Dept: ORTHOPEDICS | Facility: CLINIC | Age: 30
End: 2025-08-27
Payer: MEDICAID

## 2025-08-27 DIAGNOSIS — Z98.890 S/P ROTATOR CUFF REPAIR: Primary | ICD-10-CM

## 2025-08-27 PROCEDURE — 99213 OFFICE O/P EST LOW 20 MIN: CPT | Mod: PBBFAC,PN

## 2025-08-27 PROCEDURE — 99999 PR PBB SHADOW E&M-EST. PATIENT-LVL III: CPT | Mod: PBBFAC,,,

## 2025-09-04 ENCOUNTER — CLINICAL SUPPORT (OUTPATIENT)
Facility: HOSPITAL | Age: 30
End: 2025-09-04
Payer: MEDICAID

## 2025-09-04 DIAGNOSIS — Z98.890 S/P LEFT ROTATOR CUFF REPAIR: Primary | ICD-10-CM

## 2025-09-04 PROCEDURE — 97530 THERAPEUTIC ACTIVITIES: CPT

## (undated) DEVICE — ENDO GRASPER ETHICON 5DSG

## (undated) DEVICE — TRAY CATH 1-LYR URIMTR 16FR

## (undated) DEVICE — SYS SEE SHARP SCP ANTIFG LNG

## (undated) DEVICE — SYR 10CC LUER LOCK

## (undated) DEVICE — PENCIL SMK EVAC CONNECTOR 10FT

## (undated) DEVICE — PAD PREP CUFFED NS 24X48IN

## (undated) DEVICE — STRAP KNEE & BODY DISP 4X34IN

## (undated) DEVICE — APPLICATOR CHLORAPREP ORN 26ML

## (undated) DEVICE — SCISSOR 5MMX35CM DIRECT DRIVE

## (undated) DEVICE — LINER GLOVE POWDERFREE SZ 6.5

## (undated) DEVICE — OCCLUDER COLPO-PNEUMO STERILE

## (undated) DEVICE — DRESSING PRIMAPORE 4X3 1/8IN

## (undated) DEVICE — SUT VICRYL ANTIMICRO VIL BR

## (undated) DEVICE — SUT MONOCYRL 4-0 PS2 UND

## (undated) DEVICE — UNDERPAD DELUXE FLUFF 30X30IN

## (undated) DEVICE — ENDOSTITCH INSTRUMENT

## (undated) DEVICE — ELECTRODE REM PLYHSV RETURN 9

## (undated) DEVICE — SYR 50CC LL

## (undated) DEVICE — DRAPE LAVH SURG 109X109X100IN

## (undated) DEVICE — CLIPPER BLADE MOD 4406 (CAREF)

## (undated) DEVICE — SOL IRRI STRL WATER 1000ML

## (undated) DEVICE — SET PNEUMOCLEAR HEAT HUM SE HF

## (undated) DEVICE — ADHESIVE MASTISOL VIAL 48/BX

## (undated) DEVICE — COVER OVERHEAD SURG LT BLUE

## (undated) DEVICE — BLANKET UPPER BODY 78.7X29.9IN

## (undated) DEVICE — ADAPTER DISP HAND SWITCH

## (undated) DEVICE — JELLY SURGILUBE CARBMR FRE 2OZ

## (undated) DEVICE — CORD MONOPOLAR LAP

## (undated) DEVICE — DRESSING PRIMAPORE IV 2X3IN

## (undated) DEVICE — STRIP MEDI WND CLSR 1/2X4IN

## (undated) DEVICE — HOOK DISSECTING 5MM

## (undated) DEVICE — SCALPEL SZ 11 RETRACTABLE

## (undated) DEVICE — RELOAD V-LOC 180 0 8IN/20CM

## (undated) DEVICE — TROCAR ENDOPATH XCEL 5X100MM

## (undated) DEVICE — TROCAR ENDOPATH XCEL 11MM 10CM

## (undated) DEVICE — DRAPE UNDERBUTTOCKS PCH STRL

## (undated) DEVICE — SOL NACL IRR 1000ML BTL

## (undated) DEVICE — Device

## (undated) DEVICE — SEALER LIGASURE LAP 37CM 5MM

## (undated) DEVICE — TIP RUMI BLUE DISPOSABLE 5/BX

## (undated) DEVICE — NDL INSUF ULTRA VERESS 120MM

## (undated) DEVICE — TRAY VAG PREP